# Patient Record
Sex: FEMALE | Race: BLACK OR AFRICAN AMERICAN | NOT HISPANIC OR LATINO
[De-identification: names, ages, dates, MRNs, and addresses within clinical notes are randomized per-mention and may not be internally consistent; named-entity substitution may affect disease eponyms.]

---

## 2018-08-03 PROBLEM — Z00.00 ENCOUNTER FOR PREVENTIVE HEALTH EXAMINATION: Status: ACTIVE | Noted: 2018-08-03

## 2018-09-24 ENCOUNTER — APPOINTMENT (OUTPATIENT)
Dept: GASTROENTEROLOGY | Facility: CLINIC | Age: 63
End: 2018-09-24
Payer: MEDICARE

## 2018-09-24 VITALS
OXYGEN SATURATION: 98 % | BODY MASS INDEX: 32.2 KG/M2 | SYSTOLIC BLOOD PRESSURE: 140 MMHG | RESPIRATION RATE: 14 BRPM | WEIGHT: 175 LBS | HEART RATE: 86 BPM | HEIGHT: 62 IN | TEMPERATURE: 99 F | DIASTOLIC BLOOD PRESSURE: 80 MMHG

## 2018-09-24 DIAGNOSIS — E10.641 TYPE 1 DIABETES MELLITUS WITH HYPOGLYCEMIA WITH COMA: ICD-10-CM

## 2018-09-24 DIAGNOSIS — Z86.39 PERSONAL HISTORY OF OTHER ENDOCRINE, NUTRITIONAL AND METABOLIC DISEASE: ICD-10-CM

## 2018-09-24 DIAGNOSIS — Z80.9 FAMILY HISTORY OF MALIGNANT NEOPLASM, UNSPECIFIED: ICD-10-CM

## 2018-09-24 PROCEDURE — 99203 OFFICE O/P NEW LOW 30 MIN: CPT

## 2018-09-27 ENCOUNTER — APPOINTMENT (OUTPATIENT)
Dept: GASTROENTEROLOGY | Facility: HOSPITAL | Age: 63
End: 2018-09-27

## 2018-09-27 ENCOUNTER — OUTPATIENT (OUTPATIENT)
Dept: OUTPATIENT SERVICES | Facility: HOSPITAL | Age: 63
LOS: 1 days | Discharge: HOME | End: 2018-09-27
Payer: COMMERCIAL

## 2018-09-27 ENCOUNTER — RESULT REVIEW (OUTPATIENT)
Age: 63
End: 2018-09-27

## 2018-09-27 LAB — GLUCOSE BLDC GLUCOMTR-MCNC: 197 MG/DL — HIGH (ref 70–99)

## 2018-09-27 PROCEDURE — 45335 SIGMOIDOSCOPY W/SUBMUC INJ: CPT

## 2018-09-27 PROCEDURE — 82962 GLUCOSE BLOOD TEST: CPT

## 2018-09-27 PROCEDURE — 45331 SIGMOIDOSCOPY AND BIOPSY: CPT

## 2018-09-27 PROCEDURE — C1889: CPT

## 2018-09-27 PROCEDURE — 93005 ELECTROCARDIOGRAM TRACING: CPT

## 2018-09-27 PROCEDURE — 93010 ELECTROCARDIOGRAM REPORT: CPT

## 2018-09-27 PROCEDURE — 45346 SIGMOIDOSCOPY W/ABLATION: CPT | Mod: GC

## 2018-09-27 PROCEDURE — 88305 TISSUE EXAM BY PATHOLOGIST: CPT

## 2018-09-28 LAB — SURGICAL PATHOLOGY STUDY: SIGNIFICANT CHANGE UP

## 2018-09-30 ENCOUNTER — EMERGENCY (EMERGENCY)
Facility: HOSPITAL | Age: 63
LOS: 1 days | Discharge: HOME | End: 2018-09-30
Attending: EMERGENCY MEDICINE | Admitting: EMERGENCY MEDICINE
Payer: COMMERCIAL

## 2018-09-30 VITALS
DIASTOLIC BLOOD PRESSURE: 91 MMHG | HEART RATE: 94 BPM | RESPIRATION RATE: 16 BRPM | SYSTOLIC BLOOD PRESSURE: 136 MMHG | OXYGEN SATURATION: 99 % | TEMPERATURE: 98 F

## 2018-09-30 DIAGNOSIS — K62.89 OTHER SPECIFIED DISEASES OF ANUS AND RECTUM: ICD-10-CM

## 2018-09-30 LAB
ALBUMIN SERPL ELPH-MCNC: 4.2 G/DL — SIGNIFICANT CHANGE UP (ref 3.3–5)
ALP SERPL-CCNC: 118 U/L — SIGNIFICANT CHANGE UP (ref 40–120)
ALT FLD-CCNC: 38 U/L — SIGNIFICANT CHANGE UP (ref 10–45)
ANION GAP SERPL CALC-SCNC: 16 MMOL/L — SIGNIFICANT CHANGE UP (ref 5–17)
AST SERPL-CCNC: 45 U/L — HIGH (ref 10–40)
BASOPHILS NFR BLD AUTO: 0.4 % — SIGNIFICANT CHANGE UP (ref 0–2)
BILIRUB SERPL-MCNC: 0.5 MG/DL — SIGNIFICANT CHANGE UP (ref 0.2–1.2)
BUN SERPL-MCNC: 11 MG/DL — SIGNIFICANT CHANGE UP (ref 7–23)
CALCIUM SERPL-MCNC: 9.8 MG/DL — SIGNIFICANT CHANGE UP (ref 8.4–10.5)
CHLORIDE SERPL-SCNC: 98 MMOL/L — SIGNIFICANT CHANGE UP (ref 96–108)
CO2 SERPL-SCNC: 25 MMOL/L — SIGNIFICANT CHANGE UP (ref 22–31)
CREAT SERPL-MCNC: 1.16 MG/DL — SIGNIFICANT CHANGE UP (ref 0.5–1.3)
EOSINOPHIL NFR BLD AUTO: 1.9 % — SIGNIFICANT CHANGE UP (ref 0–6)
GLUCOSE SERPL-MCNC: 191 MG/DL — HIGH (ref 70–99)
HCT VFR BLD CALC: 38.8 % — SIGNIFICANT CHANGE UP (ref 34.5–45)
HGB BLD-MCNC: 12.9 G/DL — SIGNIFICANT CHANGE UP (ref 11.5–15.5)
LYMPHOCYTES # BLD AUTO: 11.5 % — LOW (ref 13–44)
MCHC RBC-ENTMCNC: 28.6 PG — SIGNIFICANT CHANGE UP (ref 27–34)
MCHC RBC-ENTMCNC: 33.2 G/DL — SIGNIFICANT CHANGE UP (ref 32–36)
MCV RBC AUTO: 86 FL — SIGNIFICANT CHANGE UP (ref 80–100)
MONOCYTES NFR BLD AUTO: 8.6 % — SIGNIFICANT CHANGE UP (ref 2–14)
NEUTROPHILS NFR BLD AUTO: 77.6 % — HIGH (ref 43–77)
PLATELET # BLD AUTO: 270 K/UL — SIGNIFICANT CHANGE UP (ref 150–400)
POTASSIUM SERPL-MCNC: 3.1 MMOL/L — LOW (ref 3.5–5.3)
POTASSIUM SERPL-SCNC: 3.1 MMOL/L — LOW (ref 3.5–5.3)
PROT SERPL-MCNC: 8 G/DL — SIGNIFICANT CHANGE UP (ref 6–8.3)
RBC # BLD: 4.51 M/UL — SIGNIFICANT CHANGE UP (ref 3.8–5.2)
RBC # FLD: 12.8 % — SIGNIFICANT CHANGE UP (ref 10.3–16.9)
SODIUM SERPL-SCNC: 139 MMOL/L — SIGNIFICANT CHANGE UP (ref 135–145)
WBC # BLD: 5.7 K/UL — SIGNIFICANT CHANGE UP (ref 3.8–10.5)
WBC # FLD AUTO: 5.7 K/UL — SIGNIFICANT CHANGE UP (ref 3.8–10.5)

## 2018-09-30 PROCEDURE — 93010 ELECTROCARDIOGRAM REPORT: CPT

## 2018-09-30 PROCEDURE — 99284 EMERGENCY DEPT VISIT MOD MDM: CPT | Mod: 25

## 2018-09-30 PROCEDURE — 74022 RADEX COMPL AQT ABD SERIES: CPT | Mod: 26

## 2018-09-30 RX ORDER — OXYCODONE AND ACETAMINOPHEN 5; 325 MG/1; MG/1
1 TABLET ORAL ONCE
Qty: 0 | Refills: 0 | Status: DISCONTINUED | OUTPATIENT
Start: 2018-09-30 | End: 2018-09-30

## 2018-09-30 RX ORDER — ONDANSETRON 8 MG/1
4 TABLET, FILM COATED ORAL ONCE
Qty: 0 | Refills: 0 | Status: COMPLETED | OUTPATIENT
Start: 2018-09-30 | End: 2018-09-30

## 2018-09-30 RX ORDER — IOHEXOL 300 MG/ML
30 INJECTION, SOLUTION INTRAVENOUS ONCE
Qty: 0 | Refills: 0 | Status: COMPLETED | OUTPATIENT
Start: 2018-09-30 | End: 2018-09-30

## 2018-09-30 RX ORDER — SODIUM CHLORIDE 9 MG/ML
1000 INJECTION INTRAMUSCULAR; INTRAVENOUS; SUBCUTANEOUS ONCE
Qty: 0 | Refills: 0 | Status: COMPLETED | OUTPATIENT
Start: 2018-09-30 | End: 2018-09-30

## 2018-09-30 RX ADMIN — SODIUM CHLORIDE 1000 MILLILITER(S): 9 INJECTION INTRAMUSCULAR; INTRAVENOUS; SUBCUTANEOUS at 22:27

## 2018-09-30 RX ADMIN — IOHEXOL 30 MILLILITER(S): 300 INJECTION, SOLUTION INTRAVENOUS at 22:27

## 2018-09-30 RX ADMIN — OXYCODONE AND ACETAMINOPHEN 1 TABLET(S): 5; 325 TABLET ORAL at 22:15

## 2018-09-30 RX ADMIN — SODIUM CHLORIDE 1000 MILLILITER(S): 9 INJECTION INTRAMUSCULAR; INTRAVENOUS; SUBCUTANEOUS at 23:27

## 2018-09-30 RX ADMIN — OXYCODONE AND ACETAMINOPHEN 1 TABLET(S): 5; 325 TABLET ORAL at 20:53

## 2018-09-30 RX ADMIN — ONDANSETRON 4 MILLIGRAM(S): 8 TABLET, FILM COATED ORAL at 22:28

## 2018-09-30 NOTE — ED ADULT TRIAGE NOTE - ARRIVAL INFO ADDITIONAL COMMENTS
Pt c/o rectal pain and a small amount of bleeding post rectal polyp removed 2 days ago. Pt also states she feels like her heart rate is irregular.

## 2018-09-30 NOTE — ED ADULT NURSE NOTE - OBJECTIVE STATEMENT
pt. reports that she had a polyp removed 2 days ago and has been experiencing some rectal pain and slight bleeding throughout the day today, denies carmelo blood, weakness, syncope, or other complaint at present

## 2018-09-30 NOTE — ED PROVIDER NOTE - MEDICAL DECISION MAKING DETAILS
62 y/o f presents 4 days after colonoscopy during which polyp (?mass) was removed c/o rectal pain since; vss, nontender abd, rectal exam with no blood, labs wnl, given percocet with improvement.  AXR has air fluid levels concerned for ?obstructive process.  Will get CT for further evaluation.

## 2018-09-30 NOTE — ED ADULT NURSE NOTE - CHPI ED NUR SYMPTOMS NEG
no vomiting/no tingling/no dizziness/no weakness/no fever/no nausea/no chills/no decreased eating/drinking

## 2018-09-30 NOTE — ED ADULT NURSE NOTE - NSIMPLEMENTINTERV_GEN_ALL_ED
Implemented All Universal Safety Interventions:  [place] to call system. Call bell, personal items and telephone within reach. Instruct patient to call for assistance. Room bathroom lighting operational. Non-slip footwear when patient is off stretcher. Physically safe environment: no spills, clutter or unnecessary equipment. Stretcher in lowest position, wheels locked, appropriate side rails in place.

## 2018-09-30 NOTE — ED PROVIDER NOTE - OBJECTIVE STATEMENT
62 y/o f presents stating had colonoscopy 4 days ago, polyp was removed from her colon and since then has been having rectal pain.  Pt stating she had small blood initially when wiping which has since resolved.  Pt reporting loose stools since colonoscopy.  Denies fever, chills, n/v, dysuria, all other ROS negative.

## 2018-10-01 VITALS
SYSTOLIC BLOOD PRESSURE: 134 MMHG | OXYGEN SATURATION: 99 % | RESPIRATION RATE: 17 BRPM | DIASTOLIC BLOOD PRESSURE: 68 MMHG | TEMPERATURE: 98 F | HEART RATE: 77 BPM

## 2018-10-01 PROCEDURE — 85025 COMPLETE CBC W/AUTO DIFF WBC: CPT

## 2018-10-01 PROCEDURE — 93005 ELECTROCARDIOGRAM TRACING: CPT

## 2018-10-01 PROCEDURE — 74177 CT ABD & PELVIS W/CONTRAST: CPT

## 2018-10-01 PROCEDURE — 96374 THER/PROPH/DIAG INJ IV PUSH: CPT | Mod: XU

## 2018-10-01 PROCEDURE — 74177 CT ABD & PELVIS W/CONTRAST: CPT | Mod: 26

## 2018-10-01 PROCEDURE — 99284 EMERGENCY DEPT VISIT MOD MDM: CPT | Mod: 25

## 2018-10-01 PROCEDURE — 80053 COMPREHEN METABOLIC PANEL: CPT

## 2018-10-01 PROCEDURE — 74022 RADEX COMPL AQT ABD SERIES: CPT

## 2018-10-01 PROCEDURE — 96361 HYDRATE IV INFUSION ADD-ON: CPT

## 2018-10-01 RX ORDER — OXYCODONE HYDROCHLORIDE 5 MG/1
1 TABLET ORAL
Qty: 10 | Refills: 0 | OUTPATIENT
Start: 2018-10-01

## 2018-10-01 NOTE — ED ADULT NURSE REASSESSMENT NOTE - NS ED NURSE REASSESS COMMENT FT1
PA at bedside discussing findings and dispo. with pt.
PA at bedside for EKTA
assessment as noted, nad, placed in gown for exam, provided with warm blanket for comfort
back to bed without incident, spoke with ALEJANDRO, CT delayed, CT tech "backed up,", pt. aware, with understanding verbalized
dispo. papers in progress, iv was d/c'd as noted, pt. utd, with understanding verbalized
interventions as noted, endy. well, p.o. contrast initiated, utd on poc, with understanding verbalized, assessment on-going
preliminary CT results back, awaiting final dispo.
pt. ambulated to restroom once again, with steady gait noted
pt. ambulated to restroom with steady gait noted
pt. ambulated to restroom, with steady gait noted, still awaiting CT
pt. back from CT, nad or change in condition, awaiting results, utd, with understanding verbalized
pt. back to bed without incident, awaiting CT at approx. 2330, assessment on-going
pt. medicated as noted, endy. well, utd on poc, with understanding verbalized
pt. remains in CT at present

## 2018-10-15 PROBLEM — K63.5 POLYP OF COLON: Chronic | Status: ACTIVE | Noted: 2018-09-30

## 2018-11-05 ENCOUNTER — APPOINTMENT (OUTPATIENT)
Dept: GASTROENTEROLOGY | Facility: CLINIC | Age: 63
End: 2018-11-05

## 2018-12-05 ENCOUNTER — RESULT REVIEW (OUTPATIENT)
Age: 63
End: 2018-12-05

## 2018-12-05 ENCOUNTER — OUTPATIENT (OUTPATIENT)
Dept: OUTPATIENT SERVICES | Facility: HOSPITAL | Age: 63
LOS: 1 days | Discharge: HOME | End: 2018-12-05
Payer: COMMERCIAL

## 2018-12-05 ENCOUNTER — APPOINTMENT (OUTPATIENT)
Dept: GASTROENTEROLOGY | Facility: HOSPITAL | Age: 63
End: 2018-12-05

## 2018-12-05 LAB — GLUCOSE BLDC GLUCOMTR-MCNC: 182 MG/DL — HIGH (ref 70–99)

## 2018-12-05 PROCEDURE — 45338 SIGMOIDOSCOPY W/TUMR REMOVE: CPT

## 2018-12-05 PROCEDURE — 88305 TISSUE EXAM BY PATHOLOGIST: CPT

## 2018-12-05 PROCEDURE — 45333 SIGMOIDOSCOPY & POLYPECTOMY: CPT

## 2018-12-05 PROCEDURE — 82962 GLUCOSE BLOOD TEST: CPT

## 2018-12-06 LAB — SURGICAL PATHOLOGY STUDY: SIGNIFICANT CHANGE UP

## 2019-01-24 ENCOUNTER — APPOINTMENT (OUTPATIENT)
Dept: GASTROENTEROLOGY | Facility: HOSPITAL | Age: 64
End: 2019-01-24

## 2019-01-24 ENCOUNTER — OUTPATIENT (OUTPATIENT)
Dept: OUTPATIENT SERVICES | Facility: HOSPITAL | Age: 64
LOS: 1 days | Discharge: HOME | End: 2019-01-24
Payer: COMMERCIAL

## 2019-01-24 LAB — GLUCOSE BLDC GLUCOMTR-MCNC: 148 MG/DL — HIGH (ref 70–99)

## 2019-01-24 PROCEDURE — 82962 GLUCOSE BLOOD TEST: CPT

## 2019-01-24 PROCEDURE — 45331 SIGMOIDOSCOPY AND BIOPSY: CPT

## 2019-01-24 PROCEDURE — 45378 DIAGNOSTIC COLONOSCOPY: CPT

## 2019-07-11 ENCOUNTER — APPOINTMENT (OUTPATIENT)
Dept: GASTROENTEROLOGY | Facility: HOSPITAL | Age: 64
End: 2019-07-11

## 2019-07-11 ENCOUNTER — RESULT REVIEW (OUTPATIENT)
Age: 64
End: 2019-07-11

## 2019-07-11 ENCOUNTER — OUTPATIENT (OUTPATIENT)
Dept: OUTPATIENT SERVICES | Facility: HOSPITAL | Age: 64
LOS: 1 days | Discharge: HOME | End: 2019-07-11
Payer: MEDICARE

## 2019-07-11 LAB — GLUCOSE BLDC GLUCOMTR-MCNC: 147 MG/DL — HIGH (ref 70–99)

## 2019-07-11 PROCEDURE — 45331 SIGMOIDOSCOPY AND BIOPSY: CPT

## 2019-07-12 LAB — SURGICAL PATHOLOGY STUDY: SIGNIFICANT CHANGE UP

## 2019-07-12 PROCEDURE — 45331 SIGMOIDOSCOPY AND BIOPSY: CPT

## 2019-07-12 PROCEDURE — 82962 GLUCOSE BLOOD TEST: CPT

## 2019-07-12 PROCEDURE — 88305 TISSUE EXAM BY PATHOLOGIST: CPT

## 2019-07-18 ENCOUNTER — RESULT REVIEW (OUTPATIENT)
Age: 64
End: 2019-07-18

## 2019-10-08 ENCOUNTER — FORM ENCOUNTER (OUTPATIENT)
Age: 64
End: 2019-10-08

## 2019-10-09 ENCOUNTER — APPOINTMENT (OUTPATIENT)
Dept: GASTROENTEROLOGY | Facility: HOSPITAL | Age: 64
End: 2019-10-09

## 2019-10-09 ENCOUNTER — OUTPATIENT (OUTPATIENT)
Dept: OUTPATIENT SERVICES | Facility: HOSPITAL | Age: 64
LOS: 1 days | Discharge: HOME | End: 2019-10-09
Payer: MEDICARE

## 2019-10-09 LAB — GLUCOSE BLDC GLUCOMTR-MCNC: 156 MG/DL — HIGH (ref 70–99)

## 2019-10-09 PROCEDURE — 45330 DIAGNOSTIC SIGMOIDOSCOPY: CPT

## 2019-10-09 PROCEDURE — 82962 GLUCOSE BLOOD TEST: CPT

## 2020-02-13 ENCOUNTER — OUTPATIENT (OUTPATIENT)
Dept: OUTPATIENT SERVICES | Facility: HOSPITAL | Age: 65
LOS: 1 days | Discharge: HOME | End: 2020-02-13
Payer: MEDICARE

## 2020-02-13 ENCOUNTER — APPOINTMENT (OUTPATIENT)
Dept: GASTROENTEROLOGY | Facility: HOSPITAL | Age: 65
End: 2020-02-13

## 2020-02-13 ENCOUNTER — RESULT REVIEW (OUTPATIENT)
Age: 65
End: 2020-02-13

## 2020-02-13 LAB — GLUCOSE BLDC GLUCOMTR-MCNC: 140 MG/DL — HIGH (ref 70–99)

## 2020-02-13 PROCEDURE — 88305 TISSUE EXAM BY PATHOLOGIST: CPT

## 2020-02-13 PROCEDURE — 82962 GLUCOSE BLOOD TEST: CPT

## 2020-02-13 PROCEDURE — 88305 TISSUE EXAM BY PATHOLOGIST: CPT | Mod: 26

## 2020-02-13 PROCEDURE — 45331 SIGMOIDOSCOPY AND BIOPSY: CPT

## 2020-02-13 PROCEDURE — 45330 DIAGNOSTIC SIGMOIDOSCOPY: CPT

## 2020-02-18 LAB — SURGICAL PATHOLOGY STUDY: SIGNIFICANT CHANGE UP

## 2020-06-11 ENCOUNTER — APPOINTMENT (OUTPATIENT)
Dept: GASTROENTEROLOGY | Facility: HOSPITAL | Age: 65
End: 2020-06-11

## 2020-06-25 ENCOUNTER — APPOINTMENT (OUTPATIENT)
Dept: GASTROENTEROLOGY | Facility: CLINIC | Age: 65
End: 2020-06-25

## 2020-11-11 ENCOUNTER — APPOINTMENT (OUTPATIENT)
Age: 65
End: 2020-11-11

## 2020-11-11 ENCOUNTER — OUTPATIENT (OUTPATIENT)
Dept: OUTPATIENT SERVICES | Facility: HOSPITAL | Age: 65
LOS: 1 days | Discharge: HOME | End: 2020-11-11
Payer: MEDICARE

## 2020-11-11 LAB — GLUCOSE BLDC GLUCOMTR-MCNC: 155 MG/DL — HIGH (ref 70–99)

## 2020-11-11 PROCEDURE — 82962 GLUCOSE BLOOD TEST: CPT

## 2020-11-11 PROCEDURE — 45330 DIAGNOSTIC SIGMOIDOSCOPY: CPT

## 2021-07-23 VITALS
DIASTOLIC BLOOD PRESSURE: 94 MMHG | HEART RATE: 90 BPM | RESPIRATION RATE: 16 BRPM | SYSTOLIC BLOOD PRESSURE: 138 MMHG | OXYGEN SATURATION: 99 % | TEMPERATURE: 97 F

## 2021-07-23 LAB
ALBUMIN SERPL ELPH-MCNC: 4.3 G/DL — SIGNIFICANT CHANGE UP (ref 3.3–5)
ALP SERPL-CCNC: 69 U/L — SIGNIFICANT CHANGE UP (ref 40–120)
ALT FLD-CCNC: SIGNIFICANT CHANGE UP (ref 10–45)
ANION GAP SERPL CALC-SCNC: 12 MMOL/L — SIGNIFICANT CHANGE UP (ref 5–17)
APTT BLD: 28.8 SEC — SIGNIFICANT CHANGE UP (ref 27.5–35.5)
AST SERPL-CCNC: SIGNIFICANT CHANGE UP (ref 10–40)
BASE EXCESS BLDV CALC-SCNC: -0.7 MMOL/L — SIGNIFICANT CHANGE UP (ref -2–3)
BASOPHILS # BLD AUTO: 0.01 K/UL — SIGNIFICANT CHANGE UP (ref 0–0.2)
BASOPHILS NFR BLD AUTO: 0.2 % — SIGNIFICANT CHANGE UP (ref 0–2)
BILIRUB SERPL-MCNC: 0.9 MG/DL — SIGNIFICANT CHANGE UP (ref 0.2–1.2)
BUN SERPL-MCNC: 26 MG/DL — HIGH (ref 7–23)
CALCIUM SERPL-MCNC: 9.8 MG/DL — SIGNIFICANT CHANGE UP (ref 8.4–10.5)
CHLORIDE SERPL-SCNC: 99 MMOL/L — SIGNIFICANT CHANGE UP (ref 96–108)
CHOLEST SERPL-MCNC: 231 MG/DL — HIGH
CO2 BLDV-SCNC: 25.4 MMOL/L — SIGNIFICANT CHANGE UP (ref 22–26)
CO2 SERPL-SCNC: 21 MMOL/L — LOW (ref 22–31)
CREAT SERPL-MCNC: 2.05 MG/DL — HIGH (ref 0.5–1.3)
EOSINOPHIL # BLD AUTO: 0.01 K/UL — SIGNIFICANT CHANGE UP (ref 0–0.5)
EOSINOPHIL NFR BLD AUTO: 0.2 % — SIGNIFICANT CHANGE UP (ref 0–6)
GLUCOSE SERPL-MCNC: 125 MG/DL — HIGH (ref 70–99)
HCO3 BLDV-SCNC: 24 MMOL/L — SIGNIFICANT CHANGE UP (ref 22–29)
HCT VFR BLD CALC: 45.3 % — HIGH (ref 34.5–45)
HDLC SERPL-MCNC: 45 MG/DL — LOW
HGB BLD-MCNC: 14.6 G/DL — SIGNIFICANT CHANGE UP (ref 11.5–15.5)
IMM GRANULOCYTES NFR BLD AUTO: 0.4 % — SIGNIFICANT CHANGE UP (ref 0–1.5)
INR BLD: 1.12 — SIGNIFICANT CHANGE UP (ref 0.88–1.16)
LIPID PNL WITH DIRECT LDL SERPL: 155 MG/DL — HIGH
LYMPHOCYTES # BLD AUTO: 0.77 K/UL — LOW (ref 1–3.3)
LYMPHOCYTES # BLD AUTO: 14.7 % — SIGNIFICANT CHANGE UP (ref 13–44)
MCHC RBC-ENTMCNC: 27.4 PG — SIGNIFICANT CHANGE UP (ref 27–34)
MCHC RBC-ENTMCNC: 32.2 GM/DL — SIGNIFICANT CHANGE UP (ref 32–36)
MCV RBC AUTO: 85 FL — SIGNIFICANT CHANGE UP (ref 80–100)
MONOCYTES # BLD AUTO: 0.56 K/UL — SIGNIFICANT CHANGE UP (ref 0–0.9)
MONOCYTES NFR BLD AUTO: 10.7 % — SIGNIFICANT CHANGE UP (ref 2–14)
NEUTROPHILS # BLD AUTO: 3.88 K/UL — SIGNIFICANT CHANGE UP (ref 1.8–7.4)
NEUTROPHILS NFR BLD AUTO: 73.8 % — SIGNIFICANT CHANGE UP (ref 43–77)
NON HDL CHOLESTEROL: 186 MG/DL — HIGH
NRBC # BLD: 0 /100 WBCS — SIGNIFICANT CHANGE UP (ref 0–0)
PCO2 BLDV: 40 MMHG — SIGNIFICANT CHANGE UP (ref 39–42)
PH BLDV: 7.39 — SIGNIFICANT CHANGE UP (ref 7.32–7.43)
PLATELET # BLD AUTO: 284 K/UL — SIGNIFICANT CHANGE UP (ref 150–400)
PO2 BLDV: 21 MMHG — SIGNIFICANT CHANGE UP
POTASSIUM SERPL-MCNC: SIGNIFICANT CHANGE UP (ref 3.5–5.3)
POTASSIUM SERPL-SCNC: SIGNIFICANT CHANGE UP (ref 3.5–5.3)
PROT SERPL-MCNC: 8 G/DL — SIGNIFICANT CHANGE UP (ref 6–8.3)
PROTHROM AB SERPL-ACNC: 13.4 SEC — SIGNIFICANT CHANGE UP (ref 10.6–13.6)
RBC # BLD: 5.33 M/UL — HIGH (ref 3.8–5.2)
RBC # FLD: 14.5 % — SIGNIFICANT CHANGE UP (ref 10.3–14.5)
SAO2 % BLDV: 31.9 % — SIGNIFICANT CHANGE UP
SODIUM SERPL-SCNC: 132 MMOL/L — LOW (ref 135–145)
TRIGL SERPL-MCNC: 156 MG/DL — HIGH
TROPONIN T SERPL-MCNC: 0.01 NG/ML — SIGNIFICANT CHANGE UP (ref 0–0.01)
WBC # BLD: 5.25 K/UL — SIGNIFICANT CHANGE UP (ref 3.8–10.5)
WBC # FLD AUTO: 5.25 K/UL — SIGNIFICANT CHANGE UP (ref 3.8–10.5)

## 2021-07-23 PROCEDURE — 70498 CT ANGIOGRAPHY NECK: CPT | Mod: 26,MA

## 2021-07-23 PROCEDURE — 70496 CT ANGIOGRAPHY HEAD: CPT | Mod: 26,MA

## 2021-07-23 PROCEDURE — 0042T: CPT

## 2021-07-23 PROCEDURE — 99291 CRITICAL CARE FIRST HOUR: CPT

## 2021-07-23 PROCEDURE — 70450 CT HEAD/BRAIN W/O DYE: CPT | Mod: 26,MA,59

## 2021-07-23 PROCEDURE — 71045 X-RAY EXAM CHEST 1 VIEW: CPT | Mod: 26

## 2021-07-23 RX ORDER — HEPARIN SODIUM 5000 [USP'U]/ML
5000 INJECTION INTRAVENOUS; SUBCUTANEOUS EVERY 8 HOURS
Refills: 0 | Status: DISCONTINUED | OUTPATIENT
Start: 2021-07-23 | End: 2021-07-26

## 2021-07-23 RX ORDER — CLOPIDOGREL BISULFATE 75 MG/1
75 TABLET, FILM COATED ORAL ONCE
Refills: 0 | Status: COMPLETED | OUTPATIENT
Start: 2021-07-23 | End: 2021-07-23

## 2021-07-23 RX ORDER — ATORVASTATIN CALCIUM 80 MG/1
80 TABLET, FILM COATED ORAL AT BEDTIME
Refills: 0 | Status: DISCONTINUED | OUTPATIENT
Start: 2021-07-23 | End: 2021-08-02

## 2021-07-23 RX ORDER — ASPIRIN/CALCIUM CARB/MAGNESIUM 324 MG
81 TABLET ORAL DAILY
Refills: 0 | Status: DISCONTINUED | OUTPATIENT
Start: 2021-07-24 | End: 2021-07-26

## 2021-07-23 NOTE — CONSULT NOTE ADULT - ASSESSMENT
58y Female with PMHx of HTN, brought to the hospital for AMS and confusion. Patient was last seen normal by her cousin 72h ago, she noticed facial asymmetry, but thought it is related to recent dental manipulation. She also noted that she is confused. She tried calling her the next day, and patient didn't answer so she got concerned as patient always pickup her phone. Patient was found altered, and confused. In ED stroke code called initially then was cancelled as symptoms have been there >48h. NIHSS 9. CTH left frontal subcortical infarct. CTA  head showed Mild fusiform dilation of the right V4 artery to 4 mm as it traverses the foramen magnum without evidence of rupture, and 1 mm outpouchings superior to this focal dilation which may represent small saccular aneurysms or the origin of small caliber vessels. CTA neck negative. CTP showing no perfusion defect. Patient to be admitted to [facility] for further workup.  mg once given.    Neuro  #CVA workup  - s/p 325 ASA in ED  - continue aspirin 81mg and plavix 75mg daily  - continue atorvastatin 80mg daily  - q4hr stroke neuro checks and vitals  - obtain MRI Brain without contrast  - TTE with buble  - A1c, lipid panel, TSH  - PT/OT/rehab  - DVT prophylaxis    #HTN  - permissive hypertension, Goal -180  - TTE with bubble

## 2021-07-23 NOTE — ED ADULT NURSE NOTE - CHIEF COMPLAINT QUOTE
Patient presents for reported disorientation. Wrote "evlauation" on ED triage form. Patient vomited once in triage. Able to state her name and note her birthday.

## 2021-07-23 NOTE — STROKE CODE NOTE - THROMBOLYTIC THERAPY GIVEN AT AN OUTSIDE HOSPITAL WITHIN 24 HOURS OF ARRIVAL
Patient notified that prescriptions sent and that while on the Doxycyline she should hold her Multivitamin due to the iron in it interfering with the effectiveness of the doxycyline.  Patient agrees to hold the Multivitamin.   No

## 2021-07-23 NOTE — ED ADULT NURSE NOTE - OBJECTIVE STATEMENT
Pt presents asking for an evaluation. Vomited once in triage. Cousin with her states that she has been disoriented for 3 days. Pt able to state her name and birthday. Unable to state year or situation. R face droop noted. MD Elizondo at assessment. No weakness or decreased sensation on either side. IV placed will continue to monitor.

## 2021-07-24 ENCOUNTER — INPATIENT (INPATIENT)
Facility: HOSPITAL | Age: 66
LOS: 8 days | Discharge: NURSING, REHAB OR LONG-TERM CARE | DRG: 65 | End: 2021-08-02
Attending: PSYCHIATRY & NEUROLOGY | Admitting: PSYCHIATRY & NEUROLOGY
Payer: MEDICARE

## 2021-07-24 DIAGNOSIS — R29.810 FACIAL WEAKNESS: ICD-10-CM

## 2021-07-24 DIAGNOSIS — I63.9 CEREBRAL INFARCTION, UNSPECIFIED: ICD-10-CM

## 2021-07-24 DIAGNOSIS — M62.82 RHABDOMYOLYSIS: ICD-10-CM

## 2021-07-24 DIAGNOSIS — Z79.899 OTHER LONG TERM (CURRENT) DRUG THERAPY: ICD-10-CM

## 2021-07-24 DIAGNOSIS — R29.709 NIHSS SCORE 9: ICD-10-CM

## 2021-07-24 DIAGNOSIS — G93.49 OTHER ENCEPHALOPATHY: ICD-10-CM

## 2021-07-24 DIAGNOSIS — M48.02 SPINAL STENOSIS, CERVICAL REGION: ICD-10-CM

## 2021-07-24 DIAGNOSIS — E78.5 HYPERLIPIDEMIA, UNSPECIFIED: ICD-10-CM

## 2021-07-24 DIAGNOSIS — N18.9 CHRONIC KIDNEY DISEASE, UNSPECIFIED: ICD-10-CM

## 2021-07-24 DIAGNOSIS — Z79.01 LONG TERM (CURRENT) USE OF ANTICOAGULANTS: ICD-10-CM

## 2021-07-24 DIAGNOSIS — D70.9 NEUTROPENIA, UNSPECIFIED: ICD-10-CM

## 2021-07-24 DIAGNOSIS — Z91.14 PATIENT'S OTHER NONCOMPLIANCE WITH MEDICATION REGIMEN: ICD-10-CM

## 2021-07-24 DIAGNOSIS — R47.1 DYSARTHRIA AND ANARTHRIA: ICD-10-CM

## 2021-07-24 DIAGNOSIS — I12.9 HYPERTENSIVE CHRONIC KIDNEY DISEASE WITH STAGE 1 THROUGH STAGE 4 CHRONIC KIDNEY DISEASE, OR UNSPECIFIED CHRONIC KIDNEY DISEASE: ICD-10-CM

## 2021-07-24 DIAGNOSIS — E11.22 TYPE 2 DIABETES MELLITUS WITH DIABETIC CHRONIC KIDNEY DISEASE: ICD-10-CM

## 2021-07-24 DIAGNOSIS — R47.81 SLURRED SPEECH: ICD-10-CM

## 2021-07-24 DIAGNOSIS — I48.91 UNSPECIFIED ATRIAL FIBRILLATION: ICD-10-CM

## 2021-07-24 DIAGNOSIS — I63.40 CEREBRAL INFARCTION DUE TO EMBOLISM OF UNSPECIFIED CEREBRAL ARTERY: ICD-10-CM

## 2021-07-24 DIAGNOSIS — R47.01 APHASIA: ICD-10-CM

## 2021-07-24 DIAGNOSIS — N17.9 ACUTE KIDNEY FAILURE, UNSPECIFIED: ICD-10-CM

## 2021-07-24 LAB
A1C WITH ESTIMATED AVERAGE GLUCOSE RESULT: 6.3 % — HIGH (ref 4–5.6)
ALBUMIN SERPL ELPH-MCNC: 4 G/DL — SIGNIFICANT CHANGE UP (ref 3.3–5)
ALP SERPL-CCNC: 68 U/L — SIGNIFICANT CHANGE UP (ref 40–120)
ALT FLD-CCNC: 18 U/L — SIGNIFICANT CHANGE UP (ref 10–45)
ANION GAP SERPL CALC-SCNC: 15 MMOL/L — SIGNIFICANT CHANGE UP (ref 5–17)
AST SERPL-CCNC: 33 U/L — SIGNIFICANT CHANGE UP (ref 10–40)
BASOPHILS # BLD AUTO: 0.01 K/UL — SIGNIFICANT CHANGE UP (ref 0–0.2)
BASOPHILS NFR BLD AUTO: 0.3 % — SIGNIFICANT CHANGE UP (ref 0–2)
BILIRUB SERPL-MCNC: 1.1 MG/DL — SIGNIFICANT CHANGE UP (ref 0.2–1.2)
BUN SERPL-MCNC: 21 MG/DL — SIGNIFICANT CHANGE UP (ref 7–23)
CALCIUM SERPL-MCNC: 9.9 MG/DL — SIGNIFICANT CHANGE UP (ref 8.4–10.5)
CHLORIDE SERPL-SCNC: 103 MMOL/L — SIGNIFICANT CHANGE UP (ref 96–108)
CK SERPL-CCNC: 278 U/L — HIGH (ref 25–170)
CK SERPL-CCNC: 321 U/L — HIGH (ref 25–170)
CO2 SERPL-SCNC: 21 MMOL/L — LOW (ref 22–31)
COVID-19 SPIKE DOMAIN AB INTERP: NEGATIVE — SIGNIFICANT CHANGE UP
COVID-19 SPIKE DOMAIN ANTIBODY RESULT: 0.4 U/ML — SIGNIFICANT CHANGE UP
CREAT SERPL-MCNC: 1.35 MG/DL — HIGH (ref 0.5–1.3)
EOSINOPHIL # BLD AUTO: 0.02 K/UL — SIGNIFICANT CHANGE UP (ref 0–0.5)
EOSINOPHIL NFR BLD AUTO: 0.5 % — SIGNIFICANT CHANGE UP (ref 0–6)
ESTIMATED AVERAGE GLUCOSE: 134 MG/DL — HIGH (ref 68–114)
GLUCOSE SERPL-MCNC: 122 MG/DL — HIGH (ref 70–99)
HCT VFR BLD CALC: 44.6 % — SIGNIFICANT CHANGE UP (ref 34.5–45)
HCV AB S/CO SERPL IA: 0.04 S/CO — SIGNIFICANT CHANGE UP
HCV AB SERPL-IMP: SIGNIFICANT CHANGE UP
HGB BLD-MCNC: 14.1 G/DL — SIGNIFICANT CHANGE UP (ref 11.5–15.5)
IMM GRANULOCYTES NFR BLD AUTO: 0.3 % — SIGNIFICANT CHANGE UP (ref 0–1.5)
LYMPHOCYTES # BLD AUTO: 0.61 K/UL — LOW (ref 1–3.3)
LYMPHOCYTES # BLD AUTO: 16.5 % — SIGNIFICANT CHANGE UP (ref 13–44)
MAGNESIUM SERPL-MCNC: 2.2 MG/DL — SIGNIFICANT CHANGE UP (ref 1.6–2.6)
MCHC RBC-ENTMCNC: 27.1 PG — SIGNIFICANT CHANGE UP (ref 27–34)
MCHC RBC-ENTMCNC: 31.6 GM/DL — LOW (ref 32–36)
MCV RBC AUTO: 85.6 FL — SIGNIFICANT CHANGE UP (ref 80–100)
MONOCYTES # BLD AUTO: 0.43 K/UL — SIGNIFICANT CHANGE UP (ref 0–0.9)
MONOCYTES NFR BLD AUTO: 11.6 % — SIGNIFICANT CHANGE UP (ref 2–14)
NEUTROPHILS # BLD AUTO: 2.62 K/UL — SIGNIFICANT CHANGE UP (ref 1.8–7.4)
NEUTROPHILS NFR BLD AUTO: 70.8 % — SIGNIFICANT CHANGE UP (ref 43–77)
NRBC # BLD: 0 /100 WBCS — SIGNIFICANT CHANGE UP (ref 0–0)
PHOSPHATE SERPL-MCNC: 3.4 MG/DL — SIGNIFICANT CHANGE UP (ref 2.5–4.5)
PLATELET # BLD AUTO: 257 K/UL — SIGNIFICANT CHANGE UP (ref 150–400)
POTASSIUM SERPL-MCNC: 3 MMOL/L — LOW (ref 3.5–5.3)
POTASSIUM SERPL-SCNC: 3 MMOL/L — LOW (ref 3.5–5.3)
PROT SERPL-MCNC: 7.4 G/DL — SIGNIFICANT CHANGE UP (ref 6–8.3)
RBC # BLD: 5.21 M/UL — HIGH (ref 3.8–5.2)
RBC # FLD: 14.6 % — HIGH (ref 10.3–14.5)
SARS-COV-2 IGG+IGM SERPL QL IA: 0.4 U/ML — SIGNIFICANT CHANGE UP
SARS-COV-2 IGG+IGM SERPL QL IA: NEGATIVE — SIGNIFICANT CHANGE UP
SARS-COV-2 RNA SPEC QL NAA+PROBE: SIGNIFICANT CHANGE UP
SODIUM SERPL-SCNC: 139 MMOL/L — SIGNIFICANT CHANGE UP (ref 135–145)
TSH SERPL-MCNC: 0.8 UIU/ML — SIGNIFICANT CHANGE UP (ref 0.27–4.2)
WBC # BLD: 3.7 K/UL — LOW (ref 3.8–10.5)
WBC # FLD AUTO: 3.7 K/UL — LOW (ref 3.8–10.5)

## 2021-07-24 PROCEDURE — 99222 1ST HOSP IP/OBS MODERATE 55: CPT

## 2021-07-24 PROCEDURE — 99231 SBSQ HOSP IP/OBS SF/LOW 25: CPT

## 2021-07-24 RX ORDER — POTASSIUM CHLORIDE 20 MEQ
20 PACKET (EA) ORAL
Refills: 0 | Status: COMPLETED | OUTPATIENT
Start: 2021-07-24 | End: 2021-07-24

## 2021-07-24 RX ADMIN — HEPARIN SODIUM 5000 UNIT(S): 5000 INJECTION INTRAVENOUS; SUBCUTANEOUS at 06:18

## 2021-07-24 RX ADMIN — Medication 81 MILLIGRAM(S): at 11:31

## 2021-07-24 RX ADMIN — ATORVASTATIN CALCIUM 80 MILLIGRAM(S): 80 TABLET, FILM COATED ORAL at 21:32

## 2021-07-24 RX ADMIN — ATORVASTATIN CALCIUM 80 MILLIGRAM(S): 80 TABLET, FILM COATED ORAL at 00:27

## 2021-07-24 RX ADMIN — HEPARIN SODIUM 5000 UNIT(S): 5000 INJECTION INTRAVENOUS; SUBCUTANEOUS at 16:01

## 2021-07-24 RX ADMIN — Medication 20 MILLIEQUIVALENT(S): at 16:01

## 2021-07-24 RX ADMIN — HEPARIN SODIUM 5000 UNIT(S): 5000 INJECTION INTRAVENOUS; SUBCUTANEOUS at 21:32

## 2021-07-24 RX ADMIN — CLOPIDOGREL BISULFATE 75 MILLIGRAM(S): 75 TABLET, FILM COATED ORAL at 00:24

## 2021-07-24 RX ADMIN — Medication 20 MILLIEQUIVALENT(S): at 11:31

## 2021-07-24 RX ADMIN — Medication 20 MILLIEQUIVALENT(S): at 18:58

## 2021-07-24 NOTE — SPEECH LANGUAGE PATHOLOGY EVALUATION - RESPONSIVE NAMING
Phrase/sentence completion (predictable): 87%. Object function:57% ind. which increased to 85% given gestural cues Phrase/sentence completion (predictable): 87%. Object function:57% ind. which increased to 85% given gestural cues/impaired

## 2021-07-24 NOTE — CHART NOTE - NSCHARTNOTEFT_GEN_A_CORE
Spoke to patient's son, Mingo, at bedside. Son tearful that mother is having difficulty speaking. Explained that she had a stroke. All questions and concerns addressed. Stated that pharmacy is [facility], however, called and stated that not a patient at the pharmacy. Let son know that we do not have her pharmacy to confirm her home medications and he does not know them. States Dr. Kan is PCP. Attempted to reach, however, unavailable.

## 2021-07-24 NOTE — PHYSICAL THERAPY INITIAL EVALUATION ADULT - PERTINENT HX OF CURRENT PROBLEM, REHAB EVAL
Pt is a 59 yo female brought to the hospital for AMS and confusion. In ED stroke code called initially then was cancelled as symptoms have been there >48h. NIHSS 9. CTH left frontal subcortical infarct. CTA  head showed Mild fusiform dilation of the right V4 artery to 4 mm as it traverses the foramen magnum without evidence of rupture, and 1 mm outpouchings superior to this focal dilation which may represent small saccular aneurysms or the origin of small caliber vessels.

## 2021-07-24 NOTE — PROGRESS NOTE ADULT - ASSESSMENT
58y Female with PMHx of HTN, brought to the hospital for AMS and confusion. Patient was last seen normal by her cousin 72h ago, she noticed facial asymmetry, but thought it is related to recent dental manipulation. She also noted that she is confused. She tried calling her the next day, and patient didn't answer so she got concerned as patient always pickup her phone. Patient was found altered, and confused. In ED stroke code called initially then was cancelled as symptoms have been there >48h. NIHSS 9. CTH left frontal subcortical infarct. CTA  head showed Mild fusiform dilation of the right V4 artery to 4 mm as it traverses the foramen magnum without evidence of rupture, and 1 mm outpouchings superior to this focal dilation which may represent small saccular aneurysms or the origin of small caliber vessels. CTA neck negative. CTP showing no perfusion defect. Patient to be admitted to [facility] for further workup.  mg once given.    Neuro  #CVA workup  - s/p 325 ASA in ED  - continue aspirin 81mg and plavix 75mg daily  - continue atorvastatin 80mg daily  - q4hr stroke neuro checks and vitals  - obtain MRI Brain without contrast  - TTE with buble  - A1c, lipid panel, TSH  - PT/OT/rehab  - DVT prophylaxis    #HTN  - permissive hypertension, Goal -180  - TTE with bubble        Neuro  #CVA workup  - continue aspirin 81mg and plavix 75mg daily  - continue atorvastatin 80mg daily  - q4hr stroke neuro checks and vitals  - obtain MRI Brain without contrast  - Stroke Code HCT Results:   - Stroke Code CTA Results:  - Stroke education    Cards  #HTN  - permissive hypertension, Goal -180  - hold home blood pressure medication for now  - obtain TTE with bubble  - Stroke Code EKG Results:    #HLD  - high dose statin as above in CVA  - LDL results:     Pulm  - call provider if SPO2 < 94%    GI  #Nutrition/Fluids/Electrolytes   - replete K<4 and Mg <2  - Diet:  - IVF:     Renal  -     Infectious Disease  - Stroke Code CXR results:     Endocrine  #DM  - A1C results:   - ISS    - TSH results:    DVT Prophylaxis  - lovenox sq for DVT prophylaxis   - SCDs for DVT prophylaxis       IDR Goals: Goals reviewed at interdisciplinary rounds with case management, social work, physical therapy, occupational therapy, and speech language pathology.   Please see specific therapy  notes for in depth goals.  Dispo: **********(Acute rehab - can tolerate 3 hours of therapy)     Discussed daily hospital plans and goals with patient and family at bedside. (Called and updated family.)    Discussed with Neurology Attending 58y Female with PMHx of HTN, brought to the hospital for AMS and confusion. Patient was last seen normal by her cousin 72h ago, she noticed facial asymmetry, but thought it is related to recent dental manipulation. She also noted that she is confused. She tried calling her the next day, and patient didn't answer so she got concerned as patient always pickup her phone. Patient was found altered, and confused. In ED stroke code called initially then was cancelled as symptoms have been there >48h. NIHSS 9. CTH left frontal subcortical infarct. CTA  head showed Mild fusiform dilation of the right V4 artery to 4 mm as it traverses the foramen magnum without evidence of rupture, and 1 mm outpouchings superior to this focal dilation which may represent small saccular aneurysms or the origin of small caliber vessels. CTA neck negative. CTP showing no perfusion defect. Patient to be admitted to [facility] for further workup.    Neuro  #CVA workup  - continue aspirin 81mg and plavix 75mg daily  - continue atorvastatin 80mg daily  - q4hr stroke neuro checks and vitals  - obtain MRI Brain without contrast  - TTE with buble  - A1c, lipid panel, TSH  - PT/OT/speech/rehab  - DVT prophylaxis    #HTN  - permissive hypertension, Goal -180  - TTE with bubble    Neuro  #CVA workup  - continue aspirin 81mg and Plavix 75mg daily  - continue atorvastatin 80mg daily  - q4hr stroke neuro checks and vitals  - obtain MRI Brain without contrast  - Stroke Code HCT Results: No acute intracranial hemorrhage or demarcated transcortical infarction. eft frontal subcortical white matter infarct is favored to be chronic.  - Stroke Code CTA Results: Mild fusiform dilation of the right V4 artery to 4 mm as it traverses the foramen magnum without evidence of rupture. There are couple 1 mm outpouchings superior to this focal dilation which may represent small saccular aneurysms or the origin of small caliber vessels. Unremarkable CTA examination of the neck.  - Stroke education    Cards  #HTN  - permissive hypertension, Goal -180  - hold home blood pressure medication for now  - obtain TTE with bubble   - Stroke Code EKG Results: NSR    #HLD  - high dose statin as above in CVA  - LDL results: 155    Pulm  - call provider if SPO2 < 94%    GI  #Nutrition/Fluids/Electrolytes   - replete K<4 and Mg <2  - Diet: regular    Endocrine  #DM  - A1C results: pending   - TSH results: pending     DVT Prophylaxis  - lovenox sq for DVT prophylaxis   - SCDs for DVT prophylaxis     Dispo: pending PT/OT evaluation     pending home med rec - need to get into contact with family     Discussed daily hospital plans and goals with patient and family at bedside.     Discussed with Neurology Attending 58y Female with PMHx of HTN, brought to the hospital for AMS and confusion. Patient was last seen normal by her cousin 72h ago, she noticed facial asymmetry, but thought it is related to recent dental manipulation. She also noted that she is confused. She tried calling her the next day, and patient didn't answer so she got concerned as patient always pickup her phone. Patient was found altered, and confused. In ED stroke code called initially then was cancelled as symptoms have been there >48h. NIHSS 9. CTH left frontal subcortical infarct. CTA  head showed Mild fusiform dilation of the right V4 artery to 4 mm as it traverses the foramen magnum without evidence of rupture, and 1 mm outpouchings superior to this focal dilation which may represent small saccular aneurysms or the origin of small caliber vessels. CTA neck negative. CTP showing no perfusion defect. Patient to be admitted to [facility] for further workup.    Neuro  #CVA workup  - continue aspirin 81mg   - consider adding plavix 75mg dailly  - continue atorvastatin 80mg daily  - q4hr stroke neuro checks and vitals  - obtain MRI Brain without contrast  - TTE with buble  - A1c, lipid panel, TSH  - PT/OT/speech/rehab  - DVT prophylaxis    #HTN  - permissive hypertension, Goal -180  - TTE with bubble    Neuro  #CVA workup  - continue aspirin 81mg and Plavix 75mg daily  - continue atorvastatin 80mg daily  - q4hr stroke neuro checks and vitals  - obtain MRI Brain without contrast  - Stroke Code HCT Results: No acute intracranial hemorrhage or demarcated transcortical infarction. eft frontal subcortical white matter infarct is favored to be chronic.  - Stroke Code CTA Results: Mild fusiform dilation of the right V4 artery to 4 mm as it traverses the foramen magnum without evidence of rupture. There are couple 1 mm outpouchings superior to this focal dilation which may represent small saccular aneurysms or the origin of small caliber vessels. Unremarkable CTA examination of the neck.  - Stroke education    Cards  #HTN  - permissive hypertension, Goal -180  - hold home blood pressure medication for now  - obtain TTE with bubble   - Stroke Code EKG Results: NSR    #HLD  - high dose statin as above in CVA  - LDL results: 155    Pulm  - call provider if SPO2 < 94%    GI  #Nutrition/Fluids/Electrolytes   - replete K<4 and Mg <2  - Diet: regular    Endocrine  #DM  - A1C results: pending   - TSH results: pending     DVT Prophylaxis  - heparin sq for DVT prophylaxis   - SCDs for DVT prophylaxis     Dispo: pending PT/OT evaluation     pending home med rec - need to get into contact with family     Discussed daily hospital plans and goals with patient and family at bedside.     Discussed with Neurology Attending

## 2021-07-24 NOTE — PHYSICAL THERAPY INITIAL EVALUATION ADULT - FOLLOWS COMMANDS/ANSWERS QUESTIONS, REHAB EVAL
~60% multi steps command,  +expressive aphasia, able to name object telephone, watch, and pen, unable to name television, slurred speech/100% of the time/aphasia

## 2021-07-24 NOTE — SPEECH LANGUAGE PATHOLOGY EVALUATION - SLP PERTINENT HISTORY OF CURRENT PROBLEM
PMHx of HTN, brought to the hospital for AMS and confusion. Admitted for stroke w/u.  CTH left frontal subcortical infarct. CTA  head showed Mild fusiform dilation of the right V4 artery to 4 mm as it traverses the foramen magnum without evidence of rupture, and 1 mm outpouchings superior to this focal dilation which may represent small saccular aneurysms or the origin of small caliber vessels.

## 2021-07-24 NOTE — ED PROVIDER NOTE - CLINICAL SUMMARY MEDICAL DECISION MAKING FREE TEXT BOX
57 y/o F pt with acute CVA who is out of stroke code window as deficits were greater than 48 hours. No tPA or acute interventions given. Stroke team advised to give aspirin and to admit to neurology for further stroke evaluation.

## 2021-07-24 NOTE — SPEECH LANGUAGE PATHOLOGY EVALUATION - COMMENTS
Convergent Namin%; suspect poor comprehension of task directions  Cookie Theft Picture Description Task: WH- orientation questions:   Name: able to write first name ind. and last name given phonemic cues; eventually said name with unison speech   Situation: "They were searching...They saying I was...I was just down."  State: oriented given binary verbal choices  Month: Verbalized "12,000," but wrote accurate response   Year: "20 20 20 2021"  Age: wrote '66' Convergent Namin%; suspect poor comprehension of task directions  Milly Theft Picture Description Task- example of utterances: "He men tell the time...washing dishes...doing the floor. He have cookies...some of it. He's be on the chair. She's laughing. He's eating it up. She's talking to the...the...phone."   Increased word retrieval with phonemic cues.    Verbal output was reduced with occasional disfluencies and revisions d/t anomia. Grammatical errors noted with incorrect prepositions (to/on) and verb tenses (e.g. have/has). Did not assess d/t time constraints Goals:   Pt will order preferred food and drink items given rare minimal verbal cues.   Pt will provide identifying information with 90% accuracy given rare minimal verbal cues.   Pt will use functional phrases/sentences to request basic wants/needs during 80% of opportunities given occasional minimal verbal cues.      Additional goals to be added upon completion of reading and writing assessment. Limited assessment d/t time constraints

## 2021-07-24 NOTE — PHYSICAL THERAPY INITIAL EVALUATION ADULT - GENERAL OBSERVATIONS, REHAB EVAL
Pt received semi supine in bed with +hep-lock, +EKG, NAD. Pt left as found, NAD, call brantley in reach, CATY scherer.

## 2021-07-24 NOTE — SPEECH LANGUAGE PATHOLOGY EVALUATION - SLP DIAGNOSIS
Pt presents with moderate non-fluent aphasia in setting of acute CVA. Speech is slow and halting with short/incomplete utterances and grammatical errors. Word-finding difficulties noted with occasional semantic paraphasias. Pt benefited from verbal cues to improve word finding. Receptive language skills are relatively intact for at least basic conversation. Further assessment of reading and writing skills is warranted prior to determining proficiency.

## 2021-07-24 NOTE — ED PROVIDER NOTE - OBJECTIVE STATEMENT
59 y/o F pt w/ PMHx of HTN, colon polyps, presents to the ED after being encouraged by cousin who found her confused and aphasic with facial droop in apartment today. As per cousin, pt has been like this for 3 days. She has not been seen by anyone in person and stopped answering her phone, which has not occurred before. Pt has difficulty communicating and contributing to history. Denies smoking. Was previously registered under Bhupendra Narvaez,  51, and patient ID of [id number]. 57 y/o F pt w/ PMHx of HTN, colon polyps, presents to the ED after being encouraged by cousin who found her confused and aphasic with facial droop in apartment today. As per cousin, pt has been like this for 3 days. She has not been seen by anyone in person and stopped answering her phone, which has not occurred before. Pt has difficulty communicating and contributing to history. Denies smoking. Actual identitiy , initially unknown at registration,  Bhupendra Narvaez,  51, and patient ID of [id number].

## 2021-07-24 NOTE — PHYSICAL THERAPY INITIAL EVALUATION ADULT - MODALITIES TREATMENT COMMENTS
CN Testing: B/L Frontalis intact; B/L buccinator intact; smile asymmetrical with R facial droop; tongue protrusion at midline; B/L eyes open/close intact; Shoulder elevation: intact bilaterally; Vision H-Test: pt able to track cross mid line, but requires max VCs from PT to track toward R side due to possible decreased attention. Vision Quadrant Test: intact bilaterally

## 2021-07-24 NOTE — PHYSICAL THERAPY INITIAL EVALUATION ADULT - ADDITIONAL COMMENTS
Pt lives with family in an apt with 1 flight walk up. Prior to admission, pt ambulated independently without AD.

## 2021-07-24 NOTE — H&P ADULT - ASSESSMENT
58y Female with PMHx of HTN, brought to the hospital for AMS and confusion. Patient was last seen normal by her cousin 72h ago, she noticed facial asymmetry, but thought it is related to recent dental manipulation. She also noted that she is confused. She tried calling her the next day, and patient didn't answer so she got concerned as patient always pickup her phone. Patient was found altered, and confused. In ED stroke code called initially then was cancelled as symptoms have been there >48h. NIHSS 9. CTH left frontal subcortical infarct. CTA  head showed Mild fusiform dilation of the right V4 artery to 4 mm as it traverses the foramen magnum without evidence of rupture, and 1 mm outpouchings superior to this focal dilation which may represent small saccular aneurysms or the origin of small caliber vessels. CTA neck negative. CTP showing no perfusion defect. Patient to be admitted to [facility] for further workup.  mg once given.    Neuro  #CVA workup  - s/p 325 ASA in ED  - continue aspirin 81mg and plavix 75mg daily  - continue atorvastatin 80mg daily  - q4hr stroke neuro checks and vitals  - obtain MRI Brain without contrast  - TTE with buble  - A1c, lipid panel, TSH  - PT/OT/rehab  - DVT prophylaxis    #HTN  - permissive hypertension, Goal -180  - TTE with bubble

## 2021-07-24 NOTE — H&P ADULT - NSHPLABSRESULTS_GEN_ALL_CORE
.  LABS:                         14.6   5.25  )-----------( 284      ( 23 Jul 2021 22:34 )             45.3     07-23    132<L>  |  99  |  26<H>  ----------------------------<  125<H>  See Note   |  21<L>  |  2.05<H>    Ca    9.8      23 Jul 2021 22:34    TPro  8.0  /  Alb  4.3  /  TBili  0.9  /  DBili  x   /  AST  See Note  /  ALT  See Note  /  AlkPhos  69  07-23    PT/INR - ( 23 Jul 2021 22:34 )   PT: 13.4 sec;   INR: 1.12          PTT - ( 23 Jul 2021 22:34 )  PTT:28.8 sec    CARDIAC MARKERS ( 23 Jul 2021 22:34 )  x     / 0.01 ng/mL / x     / x     / x                RADIOLOGY, EKG & ADDITIONAL TESTS: Reviewed.

## 2021-07-24 NOTE — PHYSICAL THERAPY INITIAL EVALUATION ADULT - GAIT DEVIATIONS NOTED, PT EVAL
slightly unsteady gait, increased lateral sway, no lose of balance, decreased heel strike and hip flexion on RLE during ambulaiton/decreased maryellen/increased time in double stance/decreased step length

## 2021-07-24 NOTE — H&P ADULT - NSHPPHYSICALEXAM_GEN_ALL_CORE
VITAL SIGNS:  T(C): 36.3 (07-23-21 @ 21:35), Max: 36.3 (07-23-21 @ 21:35)  T(F): 97.4 (07-23-21 @ 21:35), Max: 97.4 (07-23-21 @ 21:35)  HR: 90 (07-23-21 @ 21:35) (90 - 90)  BP: 138/94 (07-23-21 @ 21:35) (138/94 - 138/94)  BP(mean): --  RR: 16 (07-23-21 @ 21:35) (16 - 16)  SpO2: 99% (07-23-21 @ 21:35) (99% - 99%)  Wt(kg): --    PHYSICAL EXAM:    Constitutional: WDWN resting comfortably in bed; NAD  Head: NC/AT  Respiratory: CTA B/L; no W/R/R, no retractions  Cardiac: +S1/S2; RRR; no M/R/G; PMI non-displaced  Gastrointestinal: soft, NT/ND; no rebound or guarding; +BSx4  Neurologic: AAOx3; CNII-XII grossly intact; no focal deficits.   Mental status: Awake, alert, AOX2. Speech is slurred, dysarthria noted, severe expressive aphasia. Follows commands.  Face is asymmetric with right sided facial droop  Neglect noted over the right on simultaneous examination   Psychiatric: affect and characteristics of appearance, verbalizations, behaviors are appropriate

## 2021-07-24 NOTE — PHYSICAL THERAPY INITIAL EVALUATION ADULT - IMPAIRMENTS FOUND, PT EVAL
aerobic capacity/endurance/arousal, attention, and cognition/gait, locomotion, and balance/gross motor/muscle strength/posture

## 2021-07-24 NOTE — ED PROVIDER NOTE - NEUROLOGICAL, MLM
Right facial droop, normal strength, slightly unsteady gait, unable to perform sensation exam due to pt's inability to communicate.

## 2021-07-24 NOTE — SPEECH LANGUAGE PATHOLOGY EVALUATION - SLP AUTOMATIC SPEECH
Provided all days of the week and months of the year after 'Monday' and 'January' were provided for initiation/intact/days of the week/months of the year

## 2021-07-24 NOTE — H&P ADULT - HISTORY OF PRESENT ILLNESS
Last known well time/Time of onset of symptoms: 24-72 h  History obtained from cousin Brenda.    HPI: 58y Female with PMHx of HTN, brought to the hospital for AMS and confusion. Patient was last seen normal by her cousin 72h ago, she noticed facial asymmetry, but thought it is related to recent dental manipulation. She also noted that she is confused. She tried calling her the next day, and patient didn't answer so she got concerned as patient always pickup her phone. Patient was found altered, and confused. In ED stroke code called initially then was cancelled as symptoms have been there >48h. NIHSS 9. CTH left frontal subcortical infarct. CTA  head showed Mild fusiform dilation of the right V4 artery to 4 mm as it traverses the foramen magnum without evidence of rupture, and 1 mm outpouchings superior to this focal dilation which may represent small saccular aneurysms or the origin of small caliber vessels. CTA neck negative. CTP showing no perfusion defect. Patient to be admitted to [facility] for further workup.  mg once given.

## 2021-07-24 NOTE — SPEECH LANGUAGE PATHOLOGY EVALUATION - CONFRONTATIONAL NAMING
62% accuracy independently which increased to ~87% given min. verbal cues. Errors marked mostly by semantic paraphasias, both related and unrelated (e.g. jar/cup, water/window)./impaired

## 2021-07-24 NOTE — PHYSICAL THERAPY INITIAL EVALUATION ADULT - MANUAL MUSCLE TESTING RESULTS, REHAB EVAL
b/l UEs~4+/5 t/o except b/l elbow extension, b/l shoulder flexion ~4-/5, b/l LEs~4+/5 except R hip flexion~3+/5

## 2021-07-24 NOTE — CONSULT NOTE ADULT - ASSESSMENT
58yF w/Hx of HTN presenting for AMS, confusion, and facial asymmetry found to have expressive aphasia and L frontal subcortical infarct, NIHSS of 9    L frontal infarct - With expressive aphasia and facial asymmetry, NIHSS of 9 on presentation  - Management as per stroke service, allowing permissive HTN  - CTA w/mild fusiform dilation of the right V4 artery to 4 mm as it traverses the foramen magnum without evidence of rupture, and 1 mm outpouchings superior to this focal dilation which may represent small saccular aneurysms or the origin of small caliber vessels. CTA neck negative. CTP showing no perfusion defect.     Unclear medical history - Reported Hx of HTN, though unclear what medications patient takes at home, and family unaware  - Obtain med rec from pharmacy  - Hold antihypertensives for permissive HTN as per Stroke at this time 58yF w/Hx of HTN presenting for AMS, confusion, and facial asymmetry found to have expressive aphasia and L frontal subcortical infarct, NIHSS of 9    L frontal infarct - With expressive aphasia and facial asymmetry, NIHSS of 9 on presentation  - Management as per stroke service, allowing permissive HTN  - CTA w/mild fusiform dilation of the right V4 artery to 4 mm as it traverses the foramen magnum without evidence of rupture, and 1 mm outpouchings superior to this focal dilation which may represent small saccular aneurysms or the origin of small caliber vessels. CTA neck negative. CTP showing no perfusion defect.     Unclear medical history - Reported Hx of HTN, though unclear what medications patient takes at home, and family unaware  - Obtain med rec from pharmacy  - Hold antihypertensives for permissive HTN as per Stroke at this time    Elevated Cr - Improving since admission, presume pre-renal BRITT  - Continue to trend  - Avoid nephrotoxic agents

## 2021-07-24 NOTE — PROGRESS NOTE ADULT - SUBJECTIVE AND OBJECTIVE BOX
Neurology Stroke Progress Note    INTERVAL HPI/OVERNIGHT EVENTS:  Patient seen and examined by stroke ACP this morning while patient was eating breakfast. Patient with expressive aphasia. Asked patient's name, she stated "salad". Patient appeared frustrated by difficulty getting words out. Patient unable to state home medications, pharmacy or provide family number's phone number.     MEDICATIONS  (STANDING):  aspirin enteric coated 81 milliGRAM(s) Oral daily  atorvastatin 80 milliGRAM(s) Oral at bedtime  heparin   Injectable 5000 Unit(s) SubCutaneous every 8 hours    MEDICATIONS  (PRN):      Allergies    No Known Allergies    Intolerances        Vital Signs Last 24 Hrs  T(C): 36.7 (24 Jul 2021 09:32), Max: 36.7 (24 Jul 2021 01:23)  T(F): 98 (24 Jul 2021 09:32), Max: 98.1 (24 Jul 2021 01:23)  HR: 86 (24 Jul 2021 08:49) (82 - 96)  BP: 130/77 (24 Jul 2021 08:49) (127/74 - 143/86)  BP(mean): 98 (24 Jul 2021 08:49) (96 - 107)  RR: 18 (24 Jul 2021 08:49) (15 - 18)  SpO2: 99% (24 Jul 2021 08:49) (96% - 99%)    Physical exam:  General: No acute distress, awake and alert  Eyes: Anicteric sclerae, moist conjunctivae, see below for CNs  Cardiovascular: Regular rate and rhythm, no murmurs, rubs, or gallops. No carotid bruits.   Pulmonary: Anterior breath sounds clear bilaterally, no crackles or wheezing. No use of accessory muscles  GI: Abdomen soft, non-distended, non-tender  Extremities: Radial and DP pulses +2, no edema    Neurologic:  -Mental status: Awake, alert. mildly dysarthric, severe Expressive aphasia, able to name pen, watch, blue. Cannot state name. Follows commands. Appears frustrated and nodds head when asked if she is having difficulty finding the words she wants to say.  -Cranial nerves:   II: Visual fields are full to confrontation.  III, IV, VI: Extraocular movements are intact without nystagmus. Pupils equally round and reactive to light  V:  Facial sensation V1-V3 equal and intact   VII: right facial assymetry  Motor: Normal bulk and tone. No pronator drift. Strength bilateral upper extremity 5/5, bilateral lower extremities 5/5.  Rapid alternating movements intact and symmetric  Sensation: Intact to light touch bilaterally. Extinction to the left sided on double simultaneous testing.  Coordination: No dysmetria on finger-to-nose bilaterally   Reflexes: Downgoing toes bilaterally     LABS:                        14.1   3.70  )-----------( 257      ( 24 Jul 2021 07:48 )             44.6     07-24    139  |  103  |  21  ----------------------------<  122<H>  3.0<L>   |  21<L>  |  1.35<H>    Ca    9.9      24 Jul 2021 07:48  Phos  3.4     07-24  Mg     2.2     07-24    TPro  7.4  /  Alb  4.0  /  TBili  1.1  /  DBili  x   /  AST  33  /  ALT  18  /  AlkPhos  68  07-24    PT/INR - ( 23 Jul 2021 22:34 )   PT: 13.4 sec;   INR: 1.12          PTT - ( 23 Jul 2021 22:34 )  PTT:28.8 sec      RADIOLOGY & ADDITIONAL TESTS:     CT Angio Neck w/ IV Cont (07.23.21 @ 23:02) >  Mild fusiform dilation of the right V4 artery to 4 mm as it traverses the foramen magnum without evidence of rupture.  There are couple 1 mm outpouchings superior to this focal dilation which may represent small saccular aneurysms or the origin of small caliber vessels.    CT Angio Neck w/ IV Cont (07.23.21 @ 23:02) >  Unremarkable CTA examination of the neck.      CT Perfusion w/ Maps w/ IV Cont (07.23.21 @ 23:01) >  IMPRESSION:  Normal CT perfusion study.    CT Brain Stroke Protocol (07.23.21 @ 23:01) >  No acute intracranial hemorrhage or demarcated transcortical infarction.  Left frontal subcortical white matter infarct is favored to be subacute to chronic.   Neurology Stroke Progress Note    INTERVAL HPI/OVERNIGHT EVENTS:  Patient seen and examined by stroke ACP this morning while patient was eating breakfast. Patient with expressive aphasia. Asked patient's name, she stated "salad". Patient appeared frustrated by difficulty getting words out. Patient unable to state home medications, pharmacy or provide family number's phone number.     MEDICATIONS  (STANDING):  aspirin enteric coated 81 milliGRAM(s) Oral daily  atorvastatin 80 milliGRAM(s) Oral at bedtime  heparin   Injectable 5000 Unit(s) SubCutaneous every 8 hours    MEDICATIONS  (PRN):      Allergies    No Known Allergies    Intolerances        Vital Signs Last 24 Hrs  T(C): 36.7 (24 Jul 2021 09:32), Max: 36.7 (24 Jul 2021 01:23)  T(F): 98 (24 Jul 2021 09:32), Max: 98.1 (24 Jul 2021 01:23)  HR: 86 (24 Jul 2021 08:49) (82 - 96)  BP: 130/77 (24 Jul 2021 08:49) (127/74 - 143/86)  BP(mean): 98 (24 Jul 2021 08:49) (96 - 107)  RR: 18 (24 Jul 2021 08:49) (15 - 18)  SpO2: 99% (24 Jul 2021 08:49) (96% - 99%)    Physical exam:  General: No acute distress, awake and alert  Eyes: Anicteric sclerae, moist conjunctivae, see below for CNs  Cardiovascular: Regular rate and rhythm, no murmurs, rubs, or gallops. No carotid bruits.   Pulmonary: Anterior breath sounds clear bilaterally, no crackles or wheezing. No use of accessory muscles  GI: Abdomen soft, non-distended, non-tender  Extremities: Radial and DP pulses +2, no edema    Neurologic:  -Mental status: Awake, alert. mildly dysarthric, severe Expressive aphasia, able to name pen, watch, blue. Cannot state name. Follows commands. Appears frustrated and nodds head when asked if she is having difficulty finding the words she wants to say.  -Cranial nerves:   II: Visual fields are full to confrontation.  III, IV, VI: Extraocular movements are intact without nystagmus. Pupils equally round and reactive to light  V:  Facial sensation V1-V3 equal and intact   VII: right facial assymetry  Motor: Normal bulk and tone. Slight right pronator drift. RUE 4+/5, RLE 5-/5, LUE 5/5. LLE 5/5.  Rapid alternating movements intact and symmetric  Sensation: Intact to light touch bilaterally. Extinction to the left sided on double simultaneous testing.  Coordination: No dysmetria on finger-to-nose bilaterally   Reflexes: Downgoing toes bilaterally     LABS:                        14.1   3.70  )-----------( 257      ( 24 Jul 2021 07:48 )             44.6     07-24    139  |  103  |  21  ----------------------------<  122<H>  3.0<L>   |  21<L>  |  1.35<H>    Ca    9.9      24 Jul 2021 07:48  Phos  3.4     07-24  Mg     2.2     07-24    TPro  7.4  /  Alb  4.0  /  TBili  1.1  /  DBili  x   /  AST  33  /  ALT  18  /  AlkPhos  68  07-24    PT/INR - ( 23 Jul 2021 22:34 )   PT: 13.4 sec;   INR: 1.12          PTT - ( 23 Jul 2021 22:34 )  PTT:28.8 sec      RADIOLOGY & ADDITIONAL TESTS:     CT Angio Neck w/ IV Cont (07.23.21 @ 23:02) >  Mild fusiform dilation of the right V4 artery to 4 mm as it traverses the foramen magnum without evidence of rupture.  There are couple 1 mm outpouchings superior to this focal dilation which may represent small saccular aneurysms or the origin of small caliber vessels.    CT Angio Neck w/ IV Cont (07.23.21 @ 23:02) >  Unremarkable CTA examination of the neck.      CT Perfusion w/ Maps w/ IV Cont (07.23.21 @ 23:01) >  IMPRESSION:  Normal CT perfusion study.    CT Brain Stroke Protocol (07.23.21 @ 23:01) >  No acute intracranial hemorrhage or demarcated transcortical infarction.  Left frontal subcortical white matter infarct is favored to be subacute to chronic.

## 2021-07-25 LAB
ANION GAP SERPL CALC-SCNC: 10 MMOL/L — SIGNIFICANT CHANGE UP (ref 5–17)
BUN SERPL-MCNC: 19 MG/DL — SIGNIFICANT CHANGE UP (ref 7–23)
CALCIUM SERPL-MCNC: 10 MG/DL — SIGNIFICANT CHANGE UP (ref 8.4–10.5)
CHLORIDE SERPL-SCNC: 107 MMOL/L — SIGNIFICANT CHANGE UP (ref 96–108)
CK SERPL-CCNC: 574 U/L — HIGH (ref 25–170)
CO2 SERPL-SCNC: 23 MMOL/L — SIGNIFICANT CHANGE UP (ref 22–31)
CREAT SERPL-MCNC: 1.23 MG/DL — SIGNIFICANT CHANGE UP (ref 0.5–1.3)
GLUCOSE SERPL-MCNC: 111 MG/DL — HIGH (ref 70–99)
HCT VFR BLD CALC: 42.1 % — SIGNIFICANT CHANGE UP (ref 34.5–45)
HGB BLD-MCNC: 13.8 G/DL — SIGNIFICANT CHANGE UP (ref 11.5–15.5)
MAGNESIUM SERPL-MCNC: 2 MG/DL — SIGNIFICANT CHANGE UP (ref 1.6–2.6)
MCHC RBC-ENTMCNC: 27.3 PG — SIGNIFICANT CHANGE UP (ref 27–34)
MCHC RBC-ENTMCNC: 32.8 GM/DL — SIGNIFICANT CHANGE UP (ref 32–36)
MCV RBC AUTO: 83.4 FL — SIGNIFICANT CHANGE UP (ref 80–100)
NRBC # BLD: 0 /100 WBCS — SIGNIFICANT CHANGE UP (ref 0–0)
PHOSPHATE SERPL-MCNC: 3.4 MG/DL — SIGNIFICANT CHANGE UP (ref 2.5–4.5)
PLATELET # BLD AUTO: 260 K/UL — SIGNIFICANT CHANGE UP (ref 150–400)
POTASSIUM SERPL-MCNC: 3.5 MMOL/L — SIGNIFICANT CHANGE UP (ref 3.5–5.3)
POTASSIUM SERPL-SCNC: 3.5 MMOL/L — SIGNIFICANT CHANGE UP (ref 3.5–5.3)
RBC # BLD: 5.05 M/UL — SIGNIFICANT CHANGE UP (ref 3.8–5.2)
RBC # FLD: 14.7 % — HIGH (ref 10.3–14.5)
SODIUM SERPL-SCNC: 140 MMOL/L — SIGNIFICANT CHANGE UP (ref 135–145)
WBC # BLD: 2.59 K/UL — LOW (ref 3.8–10.5)
WBC # FLD AUTO: 2.59 K/UL — LOW (ref 3.8–10.5)

## 2021-07-25 PROCEDURE — 70544 MR ANGIOGRAPHY HEAD W/O DYE: CPT | Mod: 26,59

## 2021-07-25 PROCEDURE — 70547 MR ANGIOGRAPHY NECK W/O DYE: CPT | Mod: 26

## 2021-07-25 PROCEDURE — 70551 MRI BRAIN STEM W/O DYE: CPT | Mod: 26

## 2021-07-25 PROCEDURE — 99232 SBSQ HOSP IP/OBS MODERATE 35: CPT

## 2021-07-25 PROCEDURE — 99231 SBSQ HOSP IP/OBS SF/LOW 25: CPT

## 2021-07-25 PROCEDURE — 93010 ELECTROCARDIOGRAM REPORT: CPT

## 2021-07-25 RX ORDER — PANTOPRAZOLE SODIUM 20 MG/1
40 TABLET, DELAYED RELEASE ORAL
Refills: 0 | Status: DISCONTINUED | OUTPATIENT
Start: 2021-07-25 | End: 2021-08-02

## 2021-07-25 RX ORDER — CLOPIDOGREL BISULFATE 75 MG/1
75 TABLET, FILM COATED ORAL DAILY
Refills: 0 | Status: DISCONTINUED | OUTPATIENT
Start: 2021-07-25 | End: 2021-07-26

## 2021-07-25 RX ORDER — METOPROLOL TARTRATE 50 MG
25 TABLET ORAL DAILY
Refills: 0 | Status: DISCONTINUED | OUTPATIENT
Start: 2021-07-25 | End: 2021-08-02

## 2021-07-25 RX ADMIN — PANTOPRAZOLE SODIUM 40 MILLIGRAM(S): 20 TABLET, DELAYED RELEASE ORAL at 16:59

## 2021-07-25 RX ADMIN — HEPARIN SODIUM 5000 UNIT(S): 5000 INJECTION INTRAVENOUS; SUBCUTANEOUS at 22:00

## 2021-07-25 RX ADMIN — HEPARIN SODIUM 5000 UNIT(S): 5000 INJECTION INTRAVENOUS; SUBCUTANEOUS at 16:59

## 2021-07-25 RX ADMIN — ATORVASTATIN CALCIUM 80 MILLIGRAM(S): 80 TABLET, FILM COATED ORAL at 21:57

## 2021-07-25 RX ADMIN — Medication 25 MILLIGRAM(S): at 17:00

## 2021-07-25 RX ADMIN — CLOPIDOGREL BISULFATE 75 MILLIGRAM(S): 75 TABLET, FILM COATED ORAL at 16:59

## 2021-07-25 RX ADMIN — Medication 81 MILLIGRAM(S): at 11:38

## 2021-07-25 RX ADMIN — HEPARIN SODIUM 5000 UNIT(S): 5000 INJECTION INTRAVENOUS; SUBCUTANEOUS at 06:13

## 2021-07-25 NOTE — OCCUPATIONAL THERAPY INITIAL EVALUATION ADULT - PERTINENT HX OF CURRENT PROBLEM, REHAB EVAL
CONTINUE:   of the right V4 artery to 4 mm as it traverses the foramen magnum without evidence of rupture, and 1 mm outpouchings superior to this focal dilation which may represent small saccular aneurysms or the origin of small caliber vessels. CTA neck negative.

## 2021-07-25 NOTE — OCCUPATIONAL THERAPY INITIAL EVALUATION ADULT - LIVES WITH, PROFILE
Pt lives with roommate in apt with 1 flight of stairs to enter. Pt at baseline is ind for ADLs and functional mobility.

## 2021-07-25 NOTE — PROGRESS NOTE ADULT - SUBJECTIVE AND OBJECTIVE BOX
Neurology Stroke Progress Note    INTERVAL HPI/OVERNIGHT EVENTS:  Patient seen and examined this morning. Patient able to state name and that she is at the hospital, however, remains aphasic, and unable to state age and year. No events overnight.     MEDICATIONS  (STANDING):  aspirin enteric coated 81 milliGRAM(s) Oral daily  atorvastatin 80 milliGRAM(s) Oral at bedtime  heparin   Injectable 5000 Unit(s) SubCutaneous every 8 hours    MEDICATIONS  (PRN):      Allergies    No Known Allergies    Intolerances        Vital Signs Last 24 Hrs  T(C): 36.8 (25 Jul 2021 06:30), Max: 36.8 (24 Jul 2021 21:30)  T(F): 98.2 (25 Jul 2021 06:30), Max: 98.3 (24 Jul 2021 21:30)  HR: 98 (25 Jul 2021 08:35) (82 - 116)  BP: 145/67 (25 Jul 2021 08:35) (116/72 - 147/74)  BP(mean): 96 (25 Jul 2021 08:35) (87 - 99)  RR: 18 (25 Jul 2021 08:35) (16 - 18)  SpO2: 96% (25 Jul 2021 08:35) (96% - 100%)    Physical exam:  General: No acute distress, awake and alert  Eyes: Anicteric sclerae, moist conjunctivae, see below for CNs  Neck: trachea midline, FROM, supple, no thyromegaly or lymphadenopathy  Cardiovascular: Regular rate and rhythm, no murmurs, rubs, or gallops. No carotid bruits.   Pulmonary: Anterior breath sounds clear bilaterally, no crackles or wheezing. No use of accessory muscles  GI: Abdomen soft, non-distended, non-tender  Extremities: Radial and DP pulses +2, no edema    Neurologic:  -Mental status: Awake, alert, oriented to person, hospital. significant expressive aphasia, unable to state age or year. Follows command. Able to name simple objects such as purple, watch, key.   -Cranial nerves:   II: Visual fields are full to confrontation.  III, IV, VI: Extraocular movements are intact without nystagmus. Pupils equally round and reactive to light  V:  Facial sensation V1-V3 equal and intact   VII: right facial assymetry  Motor: Normal bulk and tone. + right pronator drift. RUE 4+/5, RLE 5-/5.  LUE 5/5, LLE 5/5.  Sensation: Intact to light touch bilaterally. Extinction to right on double simultaneous testing.    LABS:                        13.8   2.59  )-----------( 260      ( 25 Jul 2021 08:30 )             42.1     07-25    140  |  107  |  19  ----------------------------<  111<H>  3.5   |  23  |  1.23    Ca    10.0      25 Jul 2021 08:30  Phos  3.4     07-25  Mg     2.0     07-25    TPro  7.4  /  Alb  4.0  /  TBili  1.1  /  DBili  x   /  AST  33  /  ALT  18  /  AlkPhos  68  07-24    PT/INR - ( 23 Jul 2021 22:34 )   PT: 13.4 sec;   INR: 1.12          PTT - ( 23 Jul 2021 22:34 )  PTT:28.8 sec      RADIOLOGY & ADDITIONAL TESTS:    ** ADDENDUM MADE TO CTA     CT Angio Neck w/ IV Cont (07.23.21 @ 23:02) >  INTERPRETATION:  I, Gerry Weinstein MD, have reviewed the images and the report and agree with thefindings, with the following modifications:    IV contrast: 80 mL Isovue-370.    CTA Neck: There is a left paracentral disc herniation at C6-7 resulting in at least moderate spinal canal stenosis. MRI cervical spine can be obtained to evaluate for cord compression and cord signal abnormality.  There is calcified plaque of the left carotid bifurcation without significant stenosis per NASCET criteria.    CTA head: Agree that there is no large vessel occlusion. There is multifocal irregularity and narrowing involving the right vertebral artery, basilar artery and right posterior cerebral artery. Agree that there is focal irregularity of the proximal right V4 segment with peripheral calcifications. The focal outpouching described within the preliminary report may reflect incompletely mineralized atherosclerotic calcifications which can be further evaluated with outpatient MRA brain. There is focal moderate stenosis of the distal right V4 segment just after the origin of the right PICA (series9 image 45). There is mild to moderate stenosis of the mid basilar artery (series 9 image 42). There is moderate stenosis of the mid right P2 segment (series 9 image 42).    There is calcified plaque of the bilateral precavernous to supraclinoid segments resulting in mild stenosis of the supraclinoid left ICA.    There is junctional dilatation of the distal right M1 segment as it divides into 4 separate M2 branches (anatomic variation).    Findings were discussed with JOAO De Jesus by Dr. Gerry Weinstein on 7/25/2021 10:10 AM       Neurology Stroke Progress Note    INTERVAL HPI/OVERNIGHT EVENTS:  Patient seen and examined this morning. Patient able to state name and that she is at the hospital, however, remains aphasic, and unable to state age and year. No events overnight.     MEDICATIONS  (STANDING):  aspirin enteric coated 81 milliGRAM(s) Oral daily  atorvastatin 80 milliGRAM(s) Oral at bedtime  heparin   Injectable 5000 Unit(s) SubCutaneous every 8 hours    MEDICATIONS  (PRN):    Home medications:  - metoprolol succinate 25 mg daily   - omeprazole 40mg  daily   - Xarelto 15 mg daily - has not picked up since January need to confirm with PCP   - amlodipine 10 mg daily     Allergies    No Known Allergies    Intolerances        Vital Signs Last 24 Hrs  T(C): 36.8 (25 Jul 2021 06:30), Max: 36.8 (24 Jul 2021 21:30)  T(F): 98.2 (25 Jul 2021 06:30), Max: 98.3 (24 Jul 2021 21:30)  HR: 98 (25 Jul 2021 08:35) (82 - 116)  BP: 145/67 (25 Jul 2021 08:35) (116/72 - 147/74)  BP(mean): 96 (25 Jul 2021 08:35) (87 - 99)  RR: 18 (25 Jul 2021 08:35) (16 - 18)  SpO2: 96% (25 Jul 2021 08:35) (96% - 100%)    Physical exam:  General: No acute distress, awake and alert  Eyes: Anicteric sclerae, moist conjunctivae, see below for CNs  Neck: trachea midline, FROM, supple, no thyromegaly or lymphadenopathy  Cardiovascular: Regular rate and rhythm, no murmurs, rubs, or gallops. No carotid bruits.   Pulmonary: Anterior breath sounds clear bilaterally, no crackles or wheezing. No use of accessory muscles  GI: Abdomen soft, non-distended, non-tender  Extremities: Radial and DP pulses +2, no edema    Neurologic:  -Mental status: Awake, alert, oriented to person, hospital. significant expressive aphasia, unable to state age or year. Follows command. Able to name simple objects such as purple, watch, key.   -Cranial nerves:   II: Visual fields are full to confrontation.  III, IV, VI: Extraocular movements are intact without nystagmus. Pupils equally round and reactive to light  V:  Facial sensation V1-V3 equal and intact   VII: right facial assymetry  Motor: Normal bulk and tone. + right pronator drift. RUE 4+/5, RLE 5-/5.  LUE 5/5, LLE 5/5.  Sensation: Intact to light touch bilaterally. Extinction to right on double simultaneous testing.    LABS:                        13.8   2.59  )-----------( 260      ( 25 Jul 2021 08:30 )             42.1     07-25    140  |  107  |  19  ----------------------------<  111<H>  3.5   |  23  |  1.23    Ca    10.0      25 Jul 2021 08:30  Phos  3.4     07-25  Mg     2.0     07-25    TPro  7.4  /  Alb  4.0  /  TBili  1.1  /  DBili  x   /  AST  33  /  ALT  18  /  AlkPhos  68  07-24    PT/INR - ( 23 Jul 2021 22:34 )   PT: 13.4 sec;   INR: 1.12          PTT - ( 23 Jul 2021 22:34 )  PTT:28.8 sec      RADIOLOGY & ADDITIONAL TESTS:    ** ADDENDUM MADE TO CTA     CT Angio Neck w/ IV Cont (07.23.21 @ 23:02) >  INTERPRETATION:  I, Gerry Weinstein MD, have reviewed the images and the report and agree with thefindings, with the following modifications:    IV contrast: 80 mL Isovue-370.    CTA Neck: There is a left paracentral disc herniation at C6-7 resulting in at least moderate spinal canal stenosis. MRI cervical spine can be obtained to evaluate for cord compression and cord signal abnormality.  There is calcified plaque of the left carotid bifurcation without significant stenosis per NASCET criteria.    CTA head: Agree that there is no large vessel occlusion. There is multifocal irregularity and narrowing involving the right vertebral artery, basilar artery and right posterior cerebral artery. Agree that there is focal irregularity of the proximal right V4 segment with peripheral calcifications. The focal outpouching described within the preliminary report may reflect incompletely mineralized atherosclerotic calcifications which can be further evaluated with outpatient MRA brain. There is focal moderate stenosis of the distal right V4 segment just after the origin of the right PICA (series9 image 45). There is mild to moderate stenosis of the mid basilar artery (series 9 image 42). There is moderate stenosis of the mid right P2 segment (series 9 image 42).    There is calcified plaque of the bilateral precavernous to supraclinoid segments resulting in mild stenosis of the supraclinoid left ICA.    There is junctional dilatation of the distal right M1 segment as it divides into 4 separate M2 branches (anatomic variation).    Findings were discussed with JOAO De Jesus by Dr. Gerry Weinstein on 7/25/2021 10:10 AM

## 2021-07-25 NOTE — OCCUPATIONAL THERAPY INITIAL EVALUATION ADULT - MODALITIES TREATMENT COMMENTS
Cranial Nerves: R facial droop with asymmetrical smile and deviated tongue. Pt performed functional mobility bedside ~5 steps- deferred further ambulation 2/2 increase HR.

## 2021-07-25 NOTE — PROGRESS NOTE ADULT - ASSESSMENT
58yF w/Hx of HTN presenting for AMS, confusion, and facial asymmetry found to have expressive aphasia and L frontal subcortical infarct, NIHSS of 9    L frontal infarct - With expressive aphasia and facial asymmetry, NIHSS of 9 on presentation  - Management as per stroke service, allowing permissive HTN  - CTA w/mild fusiform dilation of the right V4 artery to 4 mm as it traverses the foramen magnum without evidence of rupture, and 1 mm outpouchings superior to this focal dilation which may represent small saccular aneurysms or the origin of small caliber vessels. CTA neck negative. CTP showing no perfusion defect.     Unclear medical history - Reported Hx of HTN, though unclear what medications patient takes at home, and family unaware  - Med rec from pharmacy showing Rx for Metoprolol and Xarelto, though Xarelto not filled in many months, raising question of underlying A fib diagnosis  - Hold antihypertensives for permissive HTN as per Stroke at this time  - Would continue telemetry monitoring - currently in sinus, would hold Metoprolol until A fib declared on tele or collateral obtained from PMD    Elevated Cr - Improving since admission, presume pre-renal BRITT  - Continue to trend  - Avoid nephrotoxic agents

## 2021-07-25 NOTE — OCCUPATIONAL THERAPY INITIAL EVALUATION ADULT - DIAGNOSIS, OT EVAL
Pt p/w expressive aphagia +facial droop demonstrating decrease balance and strength affecting ADLs and functional mobility.

## 2021-07-25 NOTE — PROGRESS NOTE ADULT - SUBJECTIVE AND OBJECTIVE BOX
HOSPITALIST MEDICINE PROGRESS NOTE  Patient seen and examined at bedside. Sitting up eating breakfast in NAD. Expressive aphasia remains.    PERTINENT REVIEW OF SYSTEMS:  Unable to reliably obtain    Allergies    No Known Allergies    Intolerances      MEDICATIONS:  MEDICATIONS  (STANDING):  aspirin enteric coated 81 milliGRAM(s) Oral daily  atorvastatin 80 milliGRAM(s) Oral at bedtime  heparin   Injectable 5000 Unit(s) SubCutaneous every 8 hours  pantoprazole    Tablet 40 milliGRAM(s) Oral before breakfast    MEDICATIONS  (PRN):    Vital Signs Last 24 Hrs  T(C): 36.4 (25 Jul 2021 10:44), Max: 36.8 (24 Jul 2021 21:30)  T(F): 97.5 (25 Jul 2021 10:44), Max: 98.3 (24 Jul 2021 21:30)  HR: 96 (25 Jul 2021 11:45) (82 - 116)  BP: 127/79 (25 Jul 2021 11:45) (121/66 - 147/74)  BP(mean): 94 (25 Jul 2021 11:45) (87 - 99)  RR: 18 (25 Jul 2021 11:45) (16 - 18)  SpO2: 99% (25 Jul 2021 11:45) (96% - 99%)    07-24 @ 07:01  -  07-25 @ 07:00  --------------------------------------------------------  IN: 600 mL / OUT: 0 mL / NET: 600 mL      PHYSICAL EXAM:  General: Well developed; well nourished; in no acute distress  Eyes: Anicteric sclerae, moist conjunctivae, PERRLA, EOMI  HENT: Moist mucous membranes; R sided droop  Neck: Trachea midline, supple  Lungs: Normal respiratory effort, no intercostal retractions  Cardiovascular: RRR  Abdomen: Soft, non-tender non-distended; Normal bowel sounds; No rebound or guarding  Extremities: R sided facial droop  Neurological: Facial asymmetry, expressive aphasia  Skin: Warm and dry. No obvious rash    LABS:                        13.8   2.59  )-----------( 260      ( 25 Jul 2021 08:30 )             42.1     07-25    140  |  107  |  19  ----------------------------<  111<H>  3.5   |  23  |  1.23    Ca    10.0      25 Jul 2021 08:30  Phos  3.4     07-25  Mg     2.0     07-25    TPro  7.4  /  Alb  4.0  /  TBili  1.1  /  DBili  x   /  AST  33  /  ALT  18  /  AlkPhos  68  07-24    PT/INR - ( 23 Jul 2021 22:34 )   PT: 13.4 sec;   INR: 1.12          PTT - ( 23 Jul 2021 22:34 )  PTT:28.8 sec                  RADIOLOGY & ADDITIONAL STUDIES:  Reviewed

## 2021-07-25 NOTE — PROGRESS NOTE ADULT - ASSESSMENT
58y Female with PMHx of HTN, brought to the hospital for AMS and confusion. Patient was last seen normal by her cousin 72h ago, she noticed facial asymmetry, but thought it is related to recent dental manipulation. She also noted that she is confused. She tried calling her the next day, and patient didn't answer so she got concerned as patient always pickup her phone. Patient was found altered, and confused. In ED stroke code called initially then was cancelled as symptoms have been there >48h. NIHSS 9. CTH left frontal subcortical infarct. CTA  head showed Mild fusiform dilation of the right V4 artery to 4 mm as it traverses the foramen magnum without evidence of rupture, and 1 mm outpouchings superior to this focal dilation which may represent small saccular aneurysms or the origin of small caliber vessels. CTA neck negative. CTP showing no perfusion defect. Patient to be admitted to [facility] for further workup.    Neuro  #CVA workup  - continue aspirin 81mg  - consider adding plavix - pending MRA of head and neck   - continue atorvastatin 80mg daily  - q4hr stroke neuro checks and vitals  - obtain MRI Brain without contrast  - TTE with buble  - A1c, lipid panel, TSH  - PT/OT/speech/rehab  - DVT prophylaxis    #HTN  - permissive hypertension, Goal -180  - TTE with bubble    Neuro  #CVA workup  - continue aspirin 81mg and Plavix 75mg daily  - continue atorvastatin 80mg daily  - q4hr stroke neuro checks and vitals  - obtain MRI Brain without contrast  - Stroke Code HCT Results: No acute intracranial hemorrhage or demarcated transcortical infarction. eft frontal subcortical white matter infarct is favored to be chronic.  - Stroke Code CTA Results: Mild fusiform dilation of the right V4 artery to 4 mm as it traverses the foramen magnum without evidence of rupture. There are couple 1 mm outpouchings superior to this focal dilation which may represent small saccular aneurysms or the origin of small caliber vessels. Unremarkable CTA examination of the neck.  ** addendum to CTA on 7/25: left paracentral disc herniation at C6-7 resulting in at least moderate spinal canal stenosis.   There is multifocal irregularity and narrowing involving the right vertebral artery, basilar artery and right posterior cerebral artery. Agree that there is focal irregularity of the proximal right V4 segment with peripheral calcifications. The focal outpouching described within the preliminary report may reflect incompletely mineralized atherosclerotic calcifications which can be further evaluated with outpatient MRA brain.  - Obtain MRA head and neck to further evaluate CTA findings   - Obtain MR cervical spine to rule out cord compression (although does not have clinical symptoms)  - Stroke education    Cards  #HTN  - permissive hypertension, Goal -180  - hold home blood pressure medication for now  - obtain TTE with bubble   - Stroke Code EKG Results: NSR    #HLD  - high dose statin as above in CVA  - LDL results: 155    Pulm  - call provider if SPO2 < 94%    GI  #Nutrition/Fluids/Electrolytes   - replete K<4 and Mg <2  - Diet: DASH    Endocrine  #DM  - A1C results: 6.3  - TSH results: 0.802    DVT Prophylaxis  - heparin sq for DVT prophylaxis   - SCDs for DVT prophylaxis     Dispo: [place]     pending home med rec - attempting to obtain correct pharmacy    Discussed daily hospital plans and goals with patient and family at bedside.     Discussed with Neurology Attending   58y Female with PMHx of HTN, brought to the hospital for AMS and confusion. Patient was last seen normal by her cousin 72h ago, she noticed facial asymmetry, but thought it is related to recent dental manipulation. She also noted that she is confused. She tried calling her the next day, and patient didn't answer so she got concerned as patient always pickup her phone. Patient was found altered, and confused. In ED stroke code called initially then was cancelled as symptoms have been there >48h. NIHSS 9. CTH left frontal subcortical infarct. CTA  head showed Mild fusiform dilation of the right V4 artery to 4 mm as it traverses the foramen magnum without evidence of rupture, and 1 mm outpouchings superior to this focal dilation which may represent small saccular aneurysms or the origin of small caliber vessels. CTA neck negative. CTP showing no perfusion defect. Patient to be admitted to [facility] for further workup.    Neuro  Neuro  #CVA workup  - continue aspirin 81mg   - consider adding plavix pending MRA result  - called pharmacy for med rec - patient taking metoprolol and was taken Xaretlo (however, has not picked up since January) - attempted to call PCP to confirm if patient has afib, however, unable to reach.   - continue atorvastatin 80mg daily  - q4hr stroke neuro checks and vitals  - obtain MRI Brain without contrast   - A1C 6.3, TSH 0.802, - Stroke Code HCT Results: No acute intracranial hemorrhage or demarcated transcortical infarction. eft frontal subcortical white matter infarct is favored to be chronic.  - Stroke Code CTA Results: Mild fusiform dilation of the right V4 artery to 4 mm as it traverses the foramen magnum without evidence of rupture. There are couple 1 mm outpouchings superior to this focal dilation which may represent small saccular aneurysms or the origin of small caliber vessels. Unremarkable CTA examination of the neck.  ** addendum to CTA on 7/25: left paracentral disc herniation at C6-7 resulting in at least moderate spinal canal stenosis.   There is multifocal irregularity and narrowing involving the right vertebral artery, basilar artery and right posterior cerebral artery. Agree that there is focal irregularity of the proximal right V4 segment with peripheral calcifications. The focal outpouching described within the preliminary report may reflect incompletely mineralized atherosclerotic calcifications which can be further evaluated with outpatient MRA brain.  - Obtain MRA head and neck to further evaluate CTA findings   - Obtain MR cervical spine to rule out cord compression (although does not have clinical symptoms)  - Stroke education    #HTN  - permissive hypertension, Goal -180  - TTE with bubble    Cards  #HTN  - permissive hypertension, Goal -180  - hold home blood pressure medication for now  - obtain TTE with bubble   - obtain BEKAH   - Stroke Code EKG Results: NSR    #HLD  - high dose statin as above in CVA  - LDL results: 155    Pulm  - call provider if SPO2 < 94%    GI  #Nutrition/Fluids/Electrolytes   - replete K<4 and Mg <2  - Diet: DASH    Endocrine  #DM  - A1C results: 6.3  - TSH results: 0.802    DVT Prophylaxis  - heparin sq for DVT prophylaxis   - SCDs for DVT prophylaxis     Dispo: [place]     Discussed daily hospital plans and goals with patient and family at bedside.     Discussed with Neurology Attending   58y Female with PMHx of HTN, brought to the hospital for AMS and confusion. Patient was last seen normal by her cousin 72h ago, she noticed facial asymmetry, but thought it is related to recent dental manipulation. She also noted that she is confused. She tried calling her the next day, and patient didn't answer so she got concerned as patient always pickup her phone. Patient was found altered, and confused. In ED stroke code called initially then was cancelled as symptoms have been there >48h. NIHSS 9. CTH left frontal subcortical infarct. CTA  head showed Mild fusiform dilation of the right V4 artery to 4 mm as it traverses the foramen magnum without evidence of rupture, and 1 mm outpouchings superior to this focal dilation which may represent small saccular aneurysms or the origin of small caliber vessels. CTA neck negative. CTP showing no perfusion defect. Patient to be admitted to [facility] for further workup.    Neuro  Neuro  #CVA workup  - continue aspirin 81mg   - add plavix 75mg today  - called pharmacy for med rec - patient taking metoprolol and was taken Xaretlo (however, has not picked up since January) - attempted to call PCP to confirm if patient has afib, however, unable to reach.   - continue atorvastatin 80mg daily  - q4hr stroke neuro checks and vitals  - obtain MRI Brain without contrast   - A1C 6.3, TSH 0.802, - Stroke Code HCT Results: No acute intracranial hemorrhage or demarcated transcortical infarction. eft frontal subcortical white matter infarct is favored to be chronic.  - Stroke Code CTA Results: Mild fusiform dilation of the right V4 artery to 4 mm as it traverses the foramen magnum without evidence of rupture. There are couple 1 mm outpouchings superior to this focal dilation which may represent small saccular aneurysms or the origin of small caliber vessels. Unremarkable CTA examination of the neck.  ** addendum to CTA on 7/25: left paracentral disc herniation at C6-7 resulting in at least moderate spinal canal stenosis.   There is multifocal irregularity and narrowing involving the right vertebral artery, basilar artery and right posterior cerebral artery. Agree that there is focal irregularity of the proximal right V4 segment with peripheral calcifications. The focal outpouching described within the preliminary report may reflect incompletely mineralized atherosclerotic calcifications which can be further evaluated with outpatient MRA brain.  - Obtain MRA head and neck to further evaluate CTA findings   - no need for MR cervical spine as patient does not have symptoms of cord compression  - Stroke education    #HTN  - permissive hypertension, Goal -180  - TTE with bubble    Cards  #HTN  - permissive hypertension, Goal -180  - hold home blood pressure medication for now  - obtain TTE with bubble   - obtain BEKAH   - Stroke Code EKG Results: NSR    #HLD  - high dose statin as above in CVA  - LDL results: 155    Pulm  - call provider if SPO2 < 94%    GI  #Nutrition/Fluids/Electrolytes   - replete K<4 and Mg <2  - Diet: DASH    Endocrine  #DM  - A1C results: 6.3  - TSH results: 0.802    DVT Prophylaxis  - heparin sq for DVT prophylaxis   - SCDs for DVT prophylaxis     Dispo: AR     Discussed daily hospital plans and goals with patient and family at bedside.     Discussed with Neurology Attending

## 2021-07-25 NOTE — OCCUPATIONAL THERAPY INITIAL EVALUATION ADULT - MD ORDER
58y Female with PMHx of HTN, brought to the hospital for AMS and confusion. Patient was last seen normal by her cousin 72h ago, she noticed facial asymmetry, but thought it is related to recent dental manipulation.  Patient was found altered, and confused. In ED stroke code called initially then was cancelled as symptoms have been there >48h. NIHSS 9. CTH left frontal subcortical infarct. CTA  head showed Mild fusiform dilation

## 2021-07-26 LAB
ANION GAP SERPL CALC-SCNC: 11 MMOL/L — SIGNIFICANT CHANGE UP (ref 5–17)
BUN SERPL-MCNC: 23 MG/DL — SIGNIFICANT CHANGE UP (ref 7–23)
CALCIUM SERPL-MCNC: 9.7 MG/DL — SIGNIFICANT CHANGE UP (ref 8.4–10.5)
CHLORIDE SERPL-SCNC: 105 MMOL/L — SIGNIFICANT CHANGE UP (ref 96–108)
CO2 SERPL-SCNC: 22 MMOL/L — SIGNIFICANT CHANGE UP (ref 22–31)
CREAT SERPL-MCNC: 1.55 MG/DL — HIGH (ref 0.5–1.3)
GLUCOSE SERPL-MCNC: 104 MG/DL — HIGH (ref 70–99)
HCT VFR BLD CALC: 38.6 % — SIGNIFICANT CHANGE UP (ref 34.5–45)
HGB BLD-MCNC: 12.5 G/DL — SIGNIFICANT CHANGE UP (ref 11.5–15.5)
MAGNESIUM SERPL-MCNC: 1.9 MG/DL — SIGNIFICANT CHANGE UP (ref 1.6–2.6)
MCHC RBC-ENTMCNC: 27.2 PG — SIGNIFICANT CHANGE UP (ref 27–34)
MCHC RBC-ENTMCNC: 32.4 GM/DL — SIGNIFICANT CHANGE UP (ref 32–36)
MCV RBC AUTO: 84.1 FL — SIGNIFICANT CHANGE UP (ref 80–100)
NRBC # BLD: 0 /100 WBCS — SIGNIFICANT CHANGE UP (ref 0–0)
PHOSPHATE SERPL-MCNC: 3.4 MG/DL — SIGNIFICANT CHANGE UP (ref 2.5–4.5)
PLATELET # BLD AUTO: 232 K/UL — SIGNIFICANT CHANGE UP (ref 150–400)
POTASSIUM SERPL-MCNC: 3.3 MMOL/L — LOW (ref 3.5–5.3)
POTASSIUM SERPL-SCNC: 3.3 MMOL/L — LOW (ref 3.5–5.3)
RBC # BLD: 4.59 M/UL — SIGNIFICANT CHANGE UP (ref 3.8–5.2)
RBC # FLD: 14.7 % — HIGH (ref 10.3–14.5)
SODIUM SERPL-SCNC: 138 MMOL/L — SIGNIFICANT CHANGE UP (ref 135–145)
WBC # BLD: 2.99 K/UL — LOW (ref 3.8–10.5)
WBC # FLD AUTO: 2.99 K/UL — LOW (ref 3.8–10.5)

## 2021-07-26 PROCEDURE — 99233 SBSQ HOSP IP/OBS HIGH 50: CPT

## 2021-07-26 RX ORDER — POTASSIUM CHLORIDE 20 MEQ
40 PACKET (EA) ORAL EVERY 4 HOURS
Refills: 0 | Status: COMPLETED | OUTPATIENT
Start: 2021-07-26 | End: 2021-07-26

## 2021-07-26 RX ORDER — APIXABAN 2.5 MG/1
5 TABLET, FILM COATED ORAL EVERY 12 HOURS
Refills: 0 | Status: DISCONTINUED | OUTPATIENT
Start: 2021-07-26 | End: 2021-08-02

## 2021-07-26 RX ADMIN — Medication 40 MILLIEQUIVALENT(S): at 13:05

## 2021-07-26 RX ADMIN — Medication 25 MILLIGRAM(S): at 06:44

## 2021-07-26 RX ADMIN — ATORVASTATIN CALCIUM 80 MILLIGRAM(S): 80 TABLET, FILM COATED ORAL at 21:30

## 2021-07-26 RX ADMIN — Medication 40 MILLIEQUIVALENT(S): at 10:04

## 2021-07-26 RX ADMIN — PANTOPRAZOLE SODIUM 40 MILLIGRAM(S): 20 TABLET, DELAYED RELEASE ORAL at 06:44

## 2021-07-26 RX ADMIN — Medication 40 MILLIEQUIVALENT(S): at 18:20

## 2021-07-26 RX ADMIN — APIXABAN 5 MILLIGRAM(S): 2.5 TABLET, FILM COATED ORAL at 18:21

## 2021-07-26 RX ADMIN — CLOPIDOGREL BISULFATE 75 MILLIGRAM(S): 75 TABLET, FILM COATED ORAL at 11:35

## 2021-07-26 RX ADMIN — Medication 81 MILLIGRAM(S): at 11:35

## 2021-07-26 NOTE — PROGRESS NOTE ADULT - SUBJECTIVE AND OBJECTIVE BOX
Neurology Stroke Progress Note    INTERVAL HPI/OVERNIGHT EVENTS:  Patient seen and examined. No events over night. e    MEDICATIONS  (STANDING):  aspirin enteric coated 81 milliGRAM(s) Oral daily  atorvastatin 80 milliGRAM(s) Oral at bedtime  clopidogrel Tablet 75 milliGRAM(s) Oral daily  heparin   Injectable 5000 Unit(s) SubCutaneous every 8 hours  metoprolol succinate ER 25 milliGRAM(s) Oral daily  pantoprazole    Tablet 40 milliGRAM(s) Oral before breakfast    MEDICATIONS  (PRN):      Home medications:  - metoprolol succinate 25 mg daily   - omeprazole 40mg  daily   - Xarelto 15 mg daily - has not picked up since January need to confirm with PCP   - amlodipine 10 mg daily     Allergies    No Known Allergies    Intolerances        Vital Signs Last 24 Hrs  T(C): 36.4 (26 Jul 2021 05:34), Max: 36.9 (26 Jul 2021 01:00)  T(F): 97.5 (26 Jul 2021 05:34), Max: 98.4 (26 Jul 2021 01:00)  HR: 70 (26 Jul 2021 04:37) (70 - 114)  BP: 128/63 (26 Jul 2021 04:37) (119/67 - 145/67)  BP(mean): 88 (26 Jul 2021 04:37) (87 - 97)  RR: 17 (26 Jul 2021 01:15) (17 - 18)  SpO2: 99% (26 Jul 2021 04:37) (96% - 100%)    Physical exam:  General: No acute distress, awake and alert  Eyes: Anicteric sclerae, moist conjunctivae, see below for CNs  Neck: trachea midline, FROM, supple, no thyromegaly or lymphadenopathy  Cardiovascular: Regular rate and rhythm, no murmurs, rubs, or gallops. No carotid bruits.   Pulmonary: Anterior breath sounds clear bilaterally, no crackles or wheezing. No use of accessory muscles  GI: Abdomen soft, non-distended, non-tender  Extremities: Radial and DP pulses +2, no edema    Neurologic:    Neurologic:  -Mental status: Awake, alert, oriented to person, hospital. significant expressive aphasia, unable to state age or year. Follows command. Able to name simple objects such as purple, watch, key.   -Cranial nerves:   II: Visual fields are full to confrontation.  III, IV, VI: Extraocular movements are intact without nystagmus. Pupils equally round and reactive to light  V:  Facial sensation V1-V3 equal and intact   VII: right facial assymetry  Motor: Normal bulk and tone. + right pronator drift. RUE 4+/5, RLE 5-/5.  LUE 5/5, LLE 5/5.  Sensation: Intact to light touch bilaterally. Extinction to right on double simultaneous testing.    LABS:                        13.8   2.59  )-----------( 260      ( 25 Jul 2021 08:30 )             42.1     07-26    138  |  105  |  23  ----------------------------<  104<H>  3.3<L>   |  22  |  1.55<H>    Ca    9.7      26 Jul 2021 06:45  Phos  3.4     07-26  Mg     1.9     07-26    TPro  7.4  /  Alb  4.0  /  TBili  1.1  /  DBili  x   /  AST  33  /  ALT  18  /  AlkPhos  68  07-24          RADIOLOGY & ADDITIONAL TESTS:    < from: MR Head No Cont (07.25.21 @ 14:53) >  Acute/subacute left frontal lobe infarction. No mass effect or midline shift. No evidence of hemorrhagic conversion.      < from: MR Head No Cont (07.25.21 @ 14:53) >  MRA BRAIN: No large vessel occlusion. Mild stenosis of the distal right vertebral artery, mild spinal stenosis of the mid basilar artery and moderate stenosis of the right PCA as seen on recent CTA. No evidence of aneurysm of the proximal right vertebral artery corresponding to questionable outpouching on CTA which was therefore favored to represent atherosclerotic plaque.  MRA NECK: No significant stenosis of the cervical carotid or vertebral arteries.    CT Angio Head w/ IV Cont (07.23.21 @ 23:02) >  Mild fusiform dilation of the right V4 artery to 4 mm as it traverses the foramen magnum without evidence of rupture.  There are couple 1 mm outpouchings superior to this focal dilation which may represent small saccular aneurysms or the origin of small caliber vessels.    < from: CT Angio Neck w/ IV Cont (07.23.21 @ 23:02) >  The left vertebral artery terminates in the PICA. There is mild fusiform dilation of the V4 artery as it traverses the foramen magnum without evidence of rupture. There are acouple 1 mm saccular dilatations seen immediately distal to this point.       from: CT Brain Stroke Protocol (07.23.21 @ 23:01) >  Left frontal subcortical white matter infarct is favored to be chronic.      IMPRESSION:  Normal CT perfusion study.    `       Neurology Stroke Progress Note    INTERVAL HPI/OVERNIGHT EVENTS:  Patient seen and examined. No events over night. Patient had A fib yesterday, started on oral metoprolol. No other events.  She wasn't cooperative during my examination.    MEDICATIONS  (STANDING):  aspirin enteric coated 81 milliGRAM(s) Oral daily  atorvastatin 80 milliGRAM(s) Oral at bedtime  clopidogrel Tablet 75 milliGRAM(s) Oral daily  heparin   Injectable 5000 Unit(s) SubCutaneous every 8 hours  metoprolol succinate ER 25 milliGRAM(s) Oral daily  pantoprazole    Tablet 40 milliGRAM(s) Oral before breakfast    MEDICATIONS  (PRN):      Home medications:  - metoprolol succinate 25 mg daily   - omeprazole 40mg  daily   - Xarelto 15 mg daily - has not picked up since January need to confirm with PCP   - amlodipine 10 mg daily     Allergies    No Known Allergies    Intolerances        Vital Signs Last 24 Hrs  T(C): 36.4 (26 Jul 2021 05:34), Max: 36.9 (26 Jul 2021 01:00)  T(F): 97.5 (26 Jul 2021 05:34), Max: 98.4 (26 Jul 2021 01:00)  HR: 70 (26 Jul 2021 04:37) (70 - 114)  BP: 128/63 (26 Jul 2021 04:37) (119/67 - 145/67)  BP(mean): 88 (26 Jul 2021 04:37) (87 - 97)  RR: 17 (26 Jul 2021 01:15) (17 - 18)  SpO2: 99% (26 Jul 2021 04:37) (96% - 100%)    Physical exam:  General: No acute distress, awake and alert  Eyes: Anicteric sclerae, moist conjunctivae, see below for CNs  Neck: trachea midline, FROM, supple, no thyromegaly or lymphadenopathy  Cardiovascular: Regular rate and rhythm, no murmurs, rubs, or gallops. No carotid bruits.   Pulmonary: Anterior breath sounds clear bilaterally, no crackles or wheezing. No use of accessory muscles  GI: Abdomen soft, non-distended, non-tender  Extremities: Radial and DP pulses +2, no edema    Neurologic:    Neurologic:  -Mental status: Awake, alert, oriented to person, hospital. significant expressive aphasia, unable to state age or year. Follows command. Able to name simple objects such as purple, watch, key.   -Cranial nerves:   II: Visual fields are full to confrontation.  III, IV, VI: Extraocular movements are intact without nystagmus. Pupils equally round and reactive to light  V:  Facial sensation V1-V3 equal and intact   VII: right facial assymetry  Motor: Normal bulk and tone. RUE 4+/5, RLE 5-/5.  LUE 5/5, LLE 5/5.  Sensation: Intact to light touch bilaterally. Extinction to right on double simultaneous testing.    LABS:                        13.8   2.59  )-----------( 260      ( 25 Jul 2021 08:30 )             42.1     07-26    138  |  105  |  23  ----------------------------<  104<H>  3.3<L>   |  22  |  1.55<H>    Ca    9.7      26 Jul 2021 06:45  Phos  3.4     07-26  Mg     1.9     07-26    TPro  7.4  /  Alb  4.0  /  TBili  1.1  /  DBili  x   /  AST  33  /  ALT  18  /  AlkPhos  68  07-24          RADIOLOGY & ADDITIONAL TESTS:    < from: MR Head No Cont (07.25.21 @ 14:53) >  Acute/subacute left frontal lobe infarction. No mass effect or midline shift. No evidence of hemorrhagic conversion.      < from: MR Head No Cont (07.25.21 @ 14:53) >  MRA BRAIN: No large vessel occlusion. Mild stenosis of the distal right vertebral artery, mild spinal stenosis of the mid basilar artery and moderate stenosis of the right PCA as seen on recent CTA. No evidence of aneurysm of the proximal right vertebral artery corresponding to questionable outpouching on CTA which was therefore favored to represent atherosclerotic plaque.  MRA NECK: No significant stenosis of the cervical carotid or vertebral arteries.    CT Angio Head w/ IV Cont (07.23.21 @ 23:02) >  Mild fusiform dilation of the right V4 artery to 4 mm as it traverses the foramen magnum without evidence of rupture.  There are couple 1 mm outpouchings superior to this focal dilation which may represent small saccular aneurysms or the origin of small caliber vessels.    < from: CT Angio Neck w/ IV Cont (07.23.21 @ 23:02) >  The left vertebral artery terminates in the PICA. There is mild fusiform dilation of the V4 artery as it traverses the foramen magnum without evidence of rupture. There are acouple 1 mm saccular dilatations seen immediately distal to this point.       from: CT Brain Stroke Protocol (07.23.21 @ 23:01) >  Left frontal subcortical white matter infarct is favored to be chronic.      IMPRESSION:  Normal CT perfusion study.    `

## 2021-07-26 NOTE — PROGRESS NOTE ADULT - ASSESSMENT
58y Female with PMHx of HTN, brought to the hospital for AMS and confusion. Patient was last seen normal by her cousin 72h ago, she noticed facial asymmetry, but thought it is related to recent dental manipulation. She also noted that she is confused. She tried calling her the next day, and patient didn't answer so she got concerned as patient always pickup her phone. Patient was found altered, and confused. In ED stroke code called initially then was cancelled as symptoms have been there >48h. NIHSS 9. CTH left frontal subcortical infarct. CTA  head showed Mild fusiform dilation of the right V4 artery to 4 mm as it traverses the foramen magnum without evidence of rupture, and 1 mm outpouchings superior to this focal dilation which may represent small saccular aneurysms or the origin of small caliber vessels. CTA neck negative. CTP showing no perfusion defect. Patient to be admitted to [facility] for further workup.    Neuro  #CVA workup  - continue aspirin 81mg   - add plavix 75mg today  - called pharmacy for med rec - patient taking metoprolol and was taken Xaretlo (however, has not picked up since January) - attempted to call PCP to confirm if patient has afib, however, unable to reach.   - continue atorvastatin 80mg daily  - q4hr stroke neuro checks and vitals  - obtain MRI Brain without contrast   - A1C 6.3, TSH 0.802,   - Stroke Code HCT Results: No acute intracranial hemorrhage or demarcated transcortical infarction. Left frontal subcortical white matter infarct is favored to be chronic.  - Stroke Code CTA Results: Mild fusiform dilation of the right V4 artery to 4 mm as it traverses the foramen magnum without evidence of rupture. There are couple 1 mm outpouchings superior to this focal dilation which may represent small saccular aneurysms or the origin of small caliber vessels. Unremarkable CTA examination of the neck.  ** addendum to CTA on 7/25: left paracentral disc herniation at C6-7 resulting in at least moderate spinal canal stenosis.   There is multifocal irregularity and narrowing involving the right vertebral artery, basilar artery and right posterior cerebral artery. Agree that there is focal irregularity of the proximal right V4 segment with peripheral calcifications. The focal outpouching described within the preliminary report may reflect incompletely mineralized atherosclerotic calcifications which can be further evaluated with outpatient MRA brain.  - Obtain MRA head and neck to further evaluate CTA findings -->nNo aneurysm  - no need for MR cervical spine as patient does not have symptoms of cord compression  - Stroke education    #HTN  - permissive hypertension, Goal -180  - TTE with bubble  t  Cards  #HTN  - permissive hypertension, Goal -180  - hold home blood pressure medication for now  - obtain TTE with bubble   - obtain BEKAH Pending echo?  - Stroke Code EKG Results: afib?    #HLD  - high dose statin as above in CVA  - LDL results: 155    Pulm  - call provider if SPO2 < 94%    GI  #Nutrition/Fluids/Electrolytes   - replete K<4 and Mg <2  - Diet: DASH    Endocrine  #DM  - A1C results: 6.3  - TSH results: 0.802    DVT Prophylaxis  - heparin sq for DVT prophylaxis   - SCDs for DVT prophylaxis     Dispo: AR     Discussed daily hospital plans and goals with patient and family at bedside.     Discussed with Neurology Attending   58y Female with PMHx of HTN, brought to the hospital for AMS and confusion. Patient was last seen normal by her cousin 72h ago, she noticed facial asymmetry, but thought it is related to recent dental manipulation. She also noted that she is confused. She tried calling her the next day, and patient didn't answer so she got concerned as patient always pickup her phone. Patient was found altered, and confused. In ED stroke code called initially then was cancelled as symptoms have been there >48h. NIHSS 9. CTH left frontal subcortical infarct. CTA  head showed Mild fusiform dilation of the right V4 artery to 4 mm as it traverses the foramen magnum without evidence of rupture, and 1 mm outpouchings superior to this focal dilation which may represent small saccular aneurysms or the origin of small caliber vessels. CTA neck negative. CTP showing no perfusion defect. Patient to be admitted to [facility] for further workup.    Neuro  #CVA workup  #Acute left frontal lobe infarction  - Etiology likely embolic given her hx of afib, and non compliance to medication  - Start Eliquis 5mg bid  - D/C DAPT  - called pharmacy for med rec - patient taking metoprolol and was taken Xaretlo (however, has not picked up since January) - attempted to call PCP to confirm if patient has afib, however, unable to reach.   - continue atorvastatin 80mg daily  - q4hr stroke neuro checks and vitals  - MRI showing acute left frontal lobe infarction   - A1C 6.3, TSH 0.802,   - Stroke Code HCT Results: No acute intracranial hemorrhage or demarcated transcortical infarction. Left frontal subcortical white matter infarct is favored to be chronic.  - Stroke Code CTA Results: Mild fusiform dilation of the right V4 artery to 4 mm as it traverses the foramen magnum without evidence of rupture. There are couple 1 mm outpouchings superior to this focal dilation which may represent small saccular aneurysms or the origin of small caliber vessels. Unremarkable CTA examination of the neck. left paracentral disc herniation at C6-7 resulting in at least moderate spinal canal stenosis.   - Obtain MRA head and neck to further evaluate CTA findings -->No aneurysm  - no need for MR cervical spine as patient does not have symptoms of cord compression  - Stroke education    #HTN  - permissive hypertension, Goal -180  - TTE with bubble    Cards  #HTN  - permissive hypertension, Goal -180  - hold home blood pressure medication for now  - obtain TTE with bubble   - Stroke Code EKG Results: afib?    #HLD  - high dose statin as above in CVA  - LDL results: 155    #BRITT on CKD?  -CPK noted to be elevated  -Urine lytes  -IVF if EF is normal    Pulm  - call provider if SPO2 < 94%    GI  #Nutrition/Fluids/Electrolytes   - replete K<4 and Mg <2  - Diet: DASH    Endocrine  #DM  - A1C results: 6.3  - TSH results: 0.802    DVT Prophylaxis  - heparin sq for DVT prophylaxis   - SCDs for DVT prophylaxis     Dispo: AR   Discussed daily hospital plans and goals with patient and family at bedside.   Discussed with Neurology Attending

## 2021-07-26 NOTE — PROGRESS NOTE ADULT - SUBJECTIVE AND OBJECTIVE BOX
HOSPITALIST MEDICINE PROGRESS NOTE  Patient seen and examined at bedside. Resting in bed. Expressive aphasia remains.    PERTINENT REVIEW OF SYSTEMS:  Unable to reliably obtain    Allergies    No Known Allergies    Intolerances      MEDICATIONS  (STANDING):  aspirin enteric coated 81 milliGRAM(s) Oral daily  atorvastatin 80 milliGRAM(s) Oral at bedtime  clopidogrel Tablet 75 milliGRAM(s) Oral daily  heparin   Injectable 5000 Unit(s) SubCutaneous every 8 hours  metoprolol succinate ER 25 milliGRAM(s) Oral daily  pantoprazole    Tablet 40 milliGRAM(s) Oral before breakfast  potassium chloride    Tablet ER 40 milliEquivalent(s) Oral every 4 hours    MEDICATIONS  (PRN):      Vital Signs Last 24 Hrs  T(C): 37.2 (26 Jul 2021 10:09), Max: 37.2 (26 Jul 2021 10:09)  T(F): 99 (26 Jul 2021 10:09), Max: 99 (26 Jul 2021 10:09)  HR: 70 (26 Jul 2021 08:30) (70 - 114)  BP: 124/75 (26 Jul 2021 08:30) (119/67 - 133/74)  BP(mean): 96 (26 Jul 2021 08:30) (87 - 97)  RR: 18 (26 Jul 2021 08:30) (17 - 18)  SpO2: 99% (26 Jul 2021 08:30) (96% - 100%)      PHYSICAL EXAM:  General: Well developed; well nourished; in no acute distress  Eyes: Anicteric sclerae, moist conjunctivae, PERRLA, EOMI  HENT: Moist mucous membranes; R sided droop  Neck: Trachea midline, supple  Lungs: Normal respiratory effort, no intercostal retractions  Cardiovascular: RRR  Abdomen: Soft, non-tender non-distended; Normal bowel sounds; No rebound or guarding  Extremities: R sided facial droop  Neurological: Facial asymmetry, expressive aphasia  Skin: Warm and dry. No obvious rash    LABS:                                   12.5   2.99  )-----------( 232      ( 26 Jul 2021 06:45 )             38.6   07-26    138  |  105  |  23  ----------------------------<  104<H>  3.3<L>   |  22  |  1.55<H>    Ca    9.7      26 Jul 2021 06:45  Phos  3.4     07-26  Mg     1.9     07-26                    RADIOLOGY & ADDITIONAL STUDIES:  Reviewed

## 2021-07-26 NOTE — PROGRESS NOTE ADULT - ASSESSMENT
58yF w/Hx of HTN presenting for AMS, confusion, and facial asymmetry found to have expressive aphasia and L frontal subcortical infarct, NIHSS of 9    L frontal infarct - With expressive aphasia and facial asymmetry, NIHSS of 9 on presentation  - Management as per stroke service, allowing permissive HTN  - CTA w/mild fusiform dilation of the right V4 artery to 4 mm as it traverses the foramen magnum without evidence of rupture, and 1 mm outpouchings superior to this focal dilation which may represent small saccular aneurysms or the origin of small caliber vessels. CTA neck negative. CTP showing no perfusion defect.     Unclear medical history - Reported Hx of HTN, though unclear what medications patient takes at home, and family unaware  - Med rec from pharmacy showing Rx for Metoprolol and Xarelto, though Xarelto not filled in many months, raising question of underlying A fib diagnosis  - Hold antihypertensives for permissive HTN as per Stroke at this time  - Would continue telemetry monitoring - currently in sinus, would hold Metoprolol until A fib declared on tele or collateral obtained from PMD    Elevated Cr-  bump today  - please f/u urine lytes   - if echocardiogram wnl, consider IV fluids if prerenal etiology  - strict I&O  - Avoid nephrotoxic agents

## 2021-07-27 ENCOUNTER — RESULT REVIEW (OUTPATIENT)
Age: 66
End: 2021-07-27

## 2021-07-27 LAB
ALBUMIN SERPL ELPH-MCNC: 3.7 G/DL — SIGNIFICANT CHANGE UP (ref 3.3–5)
ALP SERPL-CCNC: 64 U/L — SIGNIFICANT CHANGE UP (ref 40–120)
ALT FLD-CCNC: 22 U/L — SIGNIFICANT CHANGE UP (ref 10–45)
ANION GAP SERPL CALC-SCNC: 10 MMOL/L — SIGNIFICANT CHANGE UP (ref 5–17)
AST SERPL-CCNC: 39 U/L — SIGNIFICANT CHANGE UP (ref 10–40)
BASOPHILS # BLD AUTO: 0.02 K/UL — SIGNIFICANT CHANGE UP (ref 0–0.2)
BASOPHILS NFR BLD AUTO: 0.9 % — SIGNIFICANT CHANGE UP (ref 0–2)
BILIRUB SERPL-MCNC: 0.4 MG/DL — SIGNIFICANT CHANGE UP (ref 0.2–1.2)
BUN SERPL-MCNC: 23 MG/DL — SIGNIFICANT CHANGE UP (ref 7–23)
CALCIUM SERPL-MCNC: 9.5 MG/DL — SIGNIFICANT CHANGE UP (ref 8.4–10.5)
CHLORIDE SERPL-SCNC: 108 MMOL/L — SIGNIFICANT CHANGE UP (ref 96–108)
CK SERPL-CCNC: 450 U/L — HIGH (ref 25–170)
CO2 SERPL-SCNC: 21 MMOL/L — LOW (ref 22–31)
CREAT SERPL-MCNC: 1.38 MG/DL — HIGH (ref 0.5–1.3)
EOSINOPHIL # BLD AUTO: 0.04 K/UL — SIGNIFICANT CHANGE UP (ref 0–0.5)
EOSINOPHIL NFR BLD AUTO: 1.7 % — SIGNIFICANT CHANGE UP (ref 0–6)
GLUCOSE SERPL-MCNC: 98 MG/DL — SIGNIFICANT CHANGE UP (ref 70–99)
HCT VFR BLD CALC: 41.7 % — SIGNIFICANT CHANGE UP (ref 34.5–45)
HGB BLD-MCNC: 13 G/DL — SIGNIFICANT CHANGE UP (ref 11.5–15.5)
HIV 1+2 AB+HIV1 P24 AG SERPL QL IA: SIGNIFICANT CHANGE UP
LYMPHOCYTES # BLD AUTO: 0.28 K/UL — LOW (ref 1–3.3)
LYMPHOCYTES # BLD AUTO: 12.2 % — LOW (ref 13–44)
MCHC RBC-ENTMCNC: 27.1 PG — SIGNIFICANT CHANGE UP (ref 27–34)
MCHC RBC-ENTMCNC: 31.2 GM/DL — LOW (ref 32–36)
MCV RBC AUTO: 86.9 FL — SIGNIFICANT CHANGE UP (ref 80–100)
MONOCYTES # BLD AUTO: 0.28 K/UL — SIGNIFICANT CHANGE UP (ref 0–0.9)
MONOCYTES NFR BLD AUTO: 12.2 % — SIGNIFICANT CHANGE UP (ref 2–14)
NEUTROPHILS # BLD AUTO: 1.6 K/UL — LOW (ref 1.8–7.4)
NEUTROPHILS NFR BLD AUTO: 68.7 % — SIGNIFICANT CHANGE UP (ref 43–77)
PLATELET # BLD AUTO: 249 K/UL — SIGNIFICANT CHANGE UP (ref 150–400)
POTASSIUM SERPL-MCNC: 4.5 MMOL/L — SIGNIFICANT CHANGE UP (ref 3.5–5.3)
POTASSIUM SERPL-SCNC: 4.5 MMOL/L — SIGNIFICANT CHANGE UP (ref 3.5–5.3)
PROT SERPL-MCNC: 7 G/DL — SIGNIFICANT CHANGE UP (ref 6–8.3)
RBC # BLD: 4.8 M/UL — SIGNIFICANT CHANGE UP (ref 3.8–5.2)
RBC # FLD: 14.9 % — HIGH (ref 10.3–14.5)
SODIUM SERPL-SCNC: 139 MMOL/L — SIGNIFICANT CHANGE UP (ref 135–145)
WBC # BLD: 2.33 K/UL — LOW (ref 3.8–10.5)
WBC # FLD AUTO: 2.33 K/UL — LOW (ref 3.8–10.5)

## 2021-07-27 PROCEDURE — 88189 FLOWCYTOMETRY/READ 16 & >: CPT

## 2021-07-27 PROCEDURE — 93306 TTE W/DOPPLER COMPLETE: CPT | Mod: 26

## 2021-07-27 PROCEDURE — 99233 SBSQ HOSP IP/OBS HIGH 50: CPT

## 2021-07-27 RX ORDER — SODIUM CHLORIDE 9 MG/ML
1000 INJECTION, SOLUTION INTRAVENOUS
Refills: 0 | Status: DISCONTINUED | OUTPATIENT
Start: 2021-07-27 | End: 2021-07-28

## 2021-07-27 RX ADMIN — APIXABAN 5 MILLIGRAM(S): 2.5 TABLET, FILM COATED ORAL at 06:18

## 2021-07-27 RX ADMIN — APIXABAN 5 MILLIGRAM(S): 2.5 TABLET, FILM COATED ORAL at 17:25

## 2021-07-27 RX ADMIN — PANTOPRAZOLE SODIUM 40 MILLIGRAM(S): 20 TABLET, DELAYED RELEASE ORAL at 06:18

## 2021-07-27 RX ADMIN — Medication 25 MILLIGRAM(S): at 06:18

## 2021-07-27 RX ADMIN — SODIUM CHLORIDE 70 MILLILITER(S): 9 INJECTION, SOLUTION INTRAVENOUS at 16:01

## 2021-07-27 RX ADMIN — ATORVASTATIN CALCIUM 80 MILLIGRAM(S): 80 TABLET, FILM COATED ORAL at 21:32

## 2021-07-27 NOTE — PROGRESS NOTE ADULT - ASSESSMENT
58y Female with PMHx of HTN, brought to the hospital for AMS and confusion. Patient was last seen normal by her cousin 72h ago, she noticed facial asymmetry, but thought it is related to recent dental manipulation. She also noted that she is confused. She tried calling her the next day, and patient didn't answer so she got concerned as patient always pickup her phone. Patient was found altered, and confused. In ED stroke code called initially then was cancelled as symptoms have been there >48h. NIHSS 9. CTH left frontal subcortical infarct. CTA  head showed Mild fusiform dilation of the right V4 artery to 4 mm as it traverses the foramen magnum without evidence of rupture, and 1 mm outpouchings superior to this focal dilation which may represent small saccular aneurysms or the origin of small caliber vessels. CTA neck negative. CTP showing no perfusion defect. Patient to be admitted to [facility] for further workup.    Neuro  #CVA workup  #Acute left frontal lobe infarction  - Etiology likely embolic given her hx of afib, and non compliance to medication  - c/w Eliquis 5mg bid  - D/C DAPT  - called pharmacy for med rec - patient taking metoprolol and was taken Xaretlo (however, has not picked up since January) - attempted to call PCP to confirm if patient has afib, however, unable to reach.   - continue atorvastatin 80mg daily  - q4hr stroke neuro checks and vitals  - MRI showing acute left frontal lobe infarction   - A1C 6.3, TSH 0.802,   - Stroke Code HCT Results: No acute intracranial hemorrhage or demarcated transcortical infarction. Left frontal subcortical white matter infarct is favored to be chronic.  - Stroke Code CTA Results: Mild fusiform dilation of the right V4 artery to 4 mm as it traverses the foramen magnum without evidence of rupture. Left paracentral disc herniation at C6-7 resulting in at least moderate spinal canal stenosis.   - MRA head and neck to further evaluate CTA findings -->No aneurysm  - no need for MR cervical spine as patient does not have symptoms of cord compression  - TTE no PFO, mildly dilated LA,   - Stroke education    Cards  #HTN  - permissive hypertension, Goal -150  - TTE with bubble no PFO, good EF  - Stroke Code EKG Results: afib?    #HLD  - high dose statin as above in CVA  - LDL results: 155    #BRITT on CKD-Improving  -CPK noted to be downtrending  -Urine lytes  -IVF if EF is normal    #Heme  -Isolated Leukopenia  -Smear: some atypical lymphocytes, otherwise mature WBCs noted  mainly lymphopenia, and mild neutropenia  -due to acute nature suspect acute cause  -Check hep panel, HIV, parvovirus, CMV, EBV  -Check peripheral flow cytometry, AHMET and ESR  -Check B12, folate and copper level    Pulm  - call provider if SPO2 < 94%    GI  #Nutrition/Fluids/Electrolytes   - replete K<4 and Mg <2  - Diet: DASH  - IVf 70cc for 10h    Endocrine  #DM  - A1C results: 6.3  - TSH results: 0.802    DVT Prophylaxis  - heparin sq for DVT prophylaxis   - SCDs for DVT prophylaxis     Dispo: AR   Discussed daily hospital plans and goals with patient and family at bedside.   Discussed with Neurology Attending

## 2021-07-27 NOTE — PROGRESS NOTE ADULT - SUBJECTIVE AND OBJECTIVE BOX
Neurology Stroke Progress Note    INTERVAL HPI/OVERNIGHT EVENTS:  Patient seen and examined. No events over night. No events on telemetry. More cooperative today during neurological exam. Neurologically stable, with mild improvement noted in her comprehension/ word finding.      MEDICATIONS  (STANDING):  apixaban 5 milliGRAM(s) Oral every 12 hours  atorvastatin 80 milliGRAM(s) Oral at bedtime  metoprolol succinate ER 25 milliGRAM(s) Oral daily  pantoprazole    Tablet 40 milliGRAM(s) Oral before breakfast    MEDICATIONS  (PRN):      Allergies    No Known Allergies    Intolerances        Vital Signs Last 24 Hrs  T(C): 36.4 (27 Jul 2021 13:21), Max: 36.7 (27 Jul 2021 00:45)  T(F): 97.5 (27 Jul 2021 13:21), Max: 98.1 (27 Jul 2021 00:45)  HR: 74 (27 Jul 2021 12:16) (70 - 76)  BP: 154/71 (27 Jul 2021 12:16) (114/68 - 154/71)  BP(mean): 102 (27 Jul 2021 12:16) (86 - 108)  RR: 18 (27 Jul 2021 12:11) (16 - 18)  SpO2: 94% (27 Jul 2021 12:16) (94% - 100%)    Physical exam:  General: No acute distress, awake and alert  Eyes: Anicteric sclerae, moist conjunctivae, see below for CNs  Neck: trachea midline, FROM, supple, no thyromegaly or lymphadenopathy  Cardiovascular: Irregular rhythm normal rate  Pulmonary: Anterior breath sounds clear bilaterally, no crackles or wheezing. No use of accessory muscles  GI: Abdomen soft, non-distended, non-tender  Extremities: Radial and DP pulses +2, no edema    Neurologic:  Neurologic:  -Mental status: Awake, alert, oriented to person, hospital. significant expressive aphasia, but able to state age/ year. Follows command. Able to name simple objects such as purple, watch, key.   -Cranial nerves:   II: Visual fields are full to confrontation.  III, IV, VI: Extraocular movements are intact without nystagmus. Pupils equally round and reactive to light  V:  Facial sensation V1-V3 equal and intact   VII: right facial assymetry  Motor: Normal bulk and tone. RUE 4+/5, RLE 5-/5.  LUE 5/5, LLE 5/5.  Sensation: Intact to light touch bilaterally. Extinction to right on double simultaneous testing.      LABS:                        13.0   2.33  )-----------( 249      ( 27 Jul 2021 06:34 )             41.7     07-27    139  |  108  |  23  ----------------------------<  98  4.5   |  21<L>  |  1.38<H>    Ca    9.5      27 Jul 2021 06:34  Phos  3.4     07-26  Mg     1.9     07-26    TPro  7.0  /  Alb  3.7  /  TBili  0.4  /  DBili  x   /  AST  39  /  ALT  22  /  AlkPhos  64  07-27          RADIOLOGY & ADDITIONAL TESTS:    MR Angio Head No Cont (07.25.21 @ 14:51) >  MRI brain: Acute/subacute left frontal lobe infarction. No mass effect or midline shift. No evidence of hemorrhagic conversion.    MRA BRAIN: No large vessel occlusion. Mild stenosis of the distal right vertebral artery, mild spinal stenosis of the mid basilar artery and moderate stenosis of the right PCA as seen on recent CTA. No evidence of aneurysm of the proximal right vertebral artery correspondingto questionable outpouching on CTA which was therefore favored to represent atherosclerotic plaque.  MRA NECK: No significant stenosis of the cervical carotid or vertebral arteries.    < from: Echocardiogram w/ Bubble and Doppler (07.27.21 @ 10:09) >     1. Mild symmetric left ventricular hypertrophy.   2. Normal left and right ventricular size and systolic function.   3. Mildly dilated left atrium.   4. Injection of agitated saline via a peripheral vein reveals no evidence of a right-to-left shunt.   5. Mild mitral regurgitation.   6. No evidence of pulmonary hypertension, pulmonary artery systolic pressure is 19 mmHg.   7. No pericardial effusion.   8. No prior echo is available for comparison.

## 2021-07-27 NOTE — PROGRESS NOTE ADULT - SUBJECTIVE AND OBJECTIVE BOX
HOSPITALIST MEDICINE PROGRESS NOTE  Patient seen and examined at bedside. Resting in bed. Expressive aphasia remains.  No acute concerns or complaints.    When asked about any concerning sx of weight loss / fever/ chills/ night sweats - patient endorses she has lost "40 lbs" in "weeks" and decreased appetite. denies any other associated symptoms or changes other than weight.    PERTINENT REVIEW OF SYSTEMS:  Unable to reliably obtain    Allergies    No Known Allergies    Intolerances      MEDICATIONS  (STANDING):  apixaban 5 milliGRAM(s) Oral every 12 hours  atorvastatin 80 milliGRAM(s) Oral at bedtime  metoprolol succinate ER 25 milliGRAM(s) Oral daily  pantoprazole    Tablet 40 milliGRAM(s) Oral before breakfast    MEDICATIONS  (PRN):      Vital Signs Last 24 Hrs  T(C): 36.4 (27 Jul 2021 13:21), Max: 36.7 (27 Jul 2021 00:45)  T(F): 97.5 (27 Jul 2021 13:21), Max: 98.1 (27 Jul 2021 00:45)  HR: 70 (27 Jul 2021 12:11) (70 - 76)  BP: 154/71 (27 Jul 2021 12:11) (114/68 - 154/71)  BP(mean): 102 (27 Jul 2021 12:11) (86 - 108)  RR: 18 (27 Jul 2021 12:11) (16 - 18)  SpO2: 100% (27 Jul 2021 12:11) (96% - 100%)    PHYSICAL EXAM:  General: Well developed; well nourished; in no acute distress  Eyes: Anicteric sclerae, moist conjunctivae, PERRLA, EOMI  HENT: Moist mucous membranes; R sided droop  Neck: Trachea midline, supple  Lungs: Normal respiratory effort, no intercostal retractions  Cardiovascular: RRR  Abdomen: Soft, non-tender non-distended; Normal bowel sounds; No rebound or guarding  Extremities: 5/5 strength b/l   Neurological: R Facial asymmetry, expressive aphasia  Skin: Warm and dry. No obvious rash    LABS:                                   13.0   2.33  )-----------( 249      ( 27 Jul 2021 06:34 )             41.7   07-27    139  |  108  |  23  ----------------------------<  98  4.5   |  21<L>  |  1.38<H>    Ca    9.5      27 Jul 2021 06:34  Phos  3.4     07-26  Mg     1.9     07-26    TPro  7.0  /  Alb  3.7  /  TBili  0.4  /  DBili  x   /  AST  39  /  ALT  22  /  AlkPhos  64  07-27                    RADIOLOGY & ADDITIONAL STUDIES:    MRA BRAIN: No large vessel occlusion. Mild stenosis of the distal right vertebral artery, mild spinal stenosis of the mid basilar artery and moderate stenosis of the right PCA as seen on recent CTA. No evidence of aneurysm of the proximal right vertebral artery corresponding to questionable outpouching on CTA which was therefore favored to represent atherosclerotic plaque.    MRA:  IMPRESSION:    MRI brain: Acute/subacute left frontal lobe infarction. No mass effect or midline shift. No evidence of hemorrhagic conversion.

## 2021-07-27 NOTE — CONSULT NOTE ADULT - SUBJECTIVE AND OBJECTIVE BOX
HOSPITALIST CONSULT NOTE  HPI:  Last known well time/Time of onset of symptoms: 24-72 h  History obtained from cousin Brenda.    HPI: 58y Female with PMHx of HTN, brought to the hospital for AMS and confusion. Patient was last seen normal by her cousin 72h ago, she noticed facial asymmetry, but thought it is related to recent dental manipulation. She also noted that she is confused. She tried calling her the next day, and patient didn't answer so she got concerned as patient always pickup her phone. Patient was found altered, and confused. In ED stroke code called initially then was cancelled as symptoms have been there >48h. NIHSS 9. CTH left frontal subcortical infarct. CTA  head showed Mild fusiform dilation of the right V4 artery to 4 mm as it traverses the foramen magnum without evidence of rupture, and 1 mm outpouchings superior to this focal dilation which may represent small saccular aneurysms or the origin of small caliber vessels. CTA neck negative. CTP showing no perfusion defect. Patient to be admitted to [facility] for further workup.  mg once given.   (24 Jul 2021 00:29)    Allergies    No Known Allergies    Intolerances      Home Medications:    MEDICATIONS:  MEDICATIONS  (STANDING):  aspirin enteric coated 81 milliGRAM(s) Oral daily  atorvastatin 80 milliGRAM(s) Oral at bedtime  heparin   Injectable 5000 Unit(s) SubCutaneous every 8 hours  potassium chloride    Tablet ER 20 milliEquivalent(s) Oral every 2 hours    MEDICATIONS  (PRN):    PAST MEDICAL & SURGICAL HISTORY:    FAMILY HISTORY:    SOCIAL HISTORY:  Unable to obtain from patient    REVIEW OF SYSTEMS:  Unable to obtain due to expressive aphasia    Vital Signs Last 24 Hrs  T(C): 36.1 (24 Jul 2021 14:30), Max: 36.7 (24 Jul 2021 01:23)  T(F): 97 (24 Jul 2021 14:30), Max: 98.1 (24 Jul 2021 01:23)  HR: 110 (24 Jul 2021 09:53) (82 - 110)  BP: 118/72 (24 Jul 2021 09:53) (118/72 - 143/86)  BP(mean): 89 (24 Jul 2021 09:53) (89 - 107)  RR: 18 (24 Jul 2021 08:49) (15 - 18)  SpO2: 97% (24 Jul 2021 09:53) (96% - 99%)    07-23 @ 07:01  -  07-24 @ 07:00  --------------------------------------------------------  IN: 250 mL / OUT: 0 mL / NET: 250 mL        PHYSICAL EXAM:  General: Well developed; well nourished; in no acute distress  Eyes: Anicteric sclerae, moist conjunctivae, PERRLA, EOMI  HENT: Moist mucous membranes; R sided droop  Neck: Trachea midline, supple  Lungs: Normal respiratory effort, no intercostal retractions  Cardiovascular: RRR  Abdomen: Soft, non-tender non-distended; Normal bowel sounds; No rebound or guarding  Extremities: R sided facial droop  Neurological: Facial asymmetry, expressive aphasia  Skin: Warm and dry. No obvious rash    LABS:                        14.1   3.70  )-----------( 257      ( 24 Jul 2021 07:48 )             44.6     07-24    139  |  103  |  21  ----------------------------<  122<H>  3.0<L>   |  21<L>  |  1.35<H>    Ca    9.9      24 Jul 2021 07:48  Phos  3.4     07-24  Mg     2.2     07-24    TPro  7.4  /  Alb  4.0  /  TBili  1.1  /  DBili  x   /  AST  33  /  ALT  18  /  AlkPhos  68  07-24        PT/INR - ( 23 Jul 2021 22:34 )   PT: 13.4 sec;   INR: 1.12          PTT - ( 23 Jul 2021 22:34 )  PTT:28.8 sec    RADIOLOGY & ADDITIONAL STUDIES:     Reviewed
HEMATOLOGY/ONCOLOGY CONSULT NOTE  Last known well time/Time of onset of symptoms: 24-72 h  History obtained from cousin Brenda.    HPI: 58y Female with PMHx of HTN, brought to the hospital for AMS and confusion. Patient was last seen normal by her cousin 72h ago, she noticed facial asymmetry, but thought it is related to recent dental manipulation. She also noted that she is confused. She tried calling her the next day, and patient didn't answer so she got concerned as patient always pickup her phone. Patient was found altered, and confused. In ED stroke code called initially then was cancelled as symptoms have been there >48h. NIHSS 9. CTH left frontal subcortical infarct. CTA  head showed Mild fusiform dilation of the right V4 artery to 4 mm as it traverses the foramen magnum without evidence of rupture, and 1 mm outpouchings superior to this focal dilation which may represent small saccular aneurysms or the origin of small caliber vessels. CTA neck negative. CTP showing no perfusion defect. Patient to be admitted to [facility] for further workup.  mg once given.   (24 Jul 2021 00:29)        Allergies    No Known Allergies    Intolerances        MEDICATIONS  (STANDING):  apixaban 5 milliGRAM(s) Oral every 12 hours  atorvastatin 80 milliGRAM(s) Oral at bedtime  metoprolol succinate ER 25 milliGRAM(s) Oral daily  pantoprazole    Tablet 40 milliGRAM(s) Oral before breakfast    MEDICATIONS  (PRN):    Vital Signs Last 24 Hrs  T(C): 36.4 (07-27-21 @ 13:21), Max: 36.7 (07-27-21 @ 00:45)  T(F): 97.5 (07-27-21 @ 13:21), Max: 98.1 (07-27-21 @ 00:45)  HR: 70 (07-27-21 @ 12:11) (70 - 76)  BP: 154/71 (07-27-21 @ 12:11) (114/68 - 154/71)  BP(mean): 102 (07-27-21 @ 12:11) (86 - 108)  RR: 18 (07-27-21 @ 12:11) (16 - 18)  SpO2: 100% (07-27-21 @ 12:11) (96% - 100%)    PHYSICAL EXAM:  Constitutional: NAD, well-groomed, well-developed  HEENT: PERRLA, EOMI, Normal Hearing, MMM  Neck: No LAD, No JVD  Back: Normal spine flexure, No CVA tenderness  Respiratory: CTAB  Cardiovascular: S1 and S2, RRR, no M/G/R  Gastrointestinal: BS+, soft, NT/ND    CBC Full  -  ( 27 Jul 2021 06:34 )  WBC Count : 2.33 K/uL  RBC Count : 4.80 M/uL  Hemoglobin : 13.0 g/dL  Hematocrit : 41.7 %  Platelet Count - Automated : 249 K/uL  Mean Cell Volume : 86.9 fl  Mean Cell Hemoglobin : 27.1 pg  Mean Cell Hemoglobin Concentration : 31.2 gm/dL  Auto Neutrophil # : 1.60 K/uL  Auto Lymphocyte # : 0.28 K/uL  Auto Monocyte # : 0.28 K/uL  Auto Eosinophil # : 0.04 K/uL  Auto Basophil # : 0.02 K/uL  Auto Neutrophil % : 68.7 %  Auto Lymphocyte % : 12.2 %  Auto Monocyte % : 12.2 %  Auto Eosinophil % : 1.7 %  Auto Basophil % : 0.9 %            07-27    139  |  108  |  23  ----------------------------<  98  4.5   |  21<L>  |  1.38<H>    Ca    9.5      27 Jul 2021 06:34  Phos  3.4     07-26  Mg     1.9     07-26    TPro  7.0  /  Alb  3.7  /  TBili  0.4  /  DBili  x   /  AST  39  /  ALT  22  /  AlkPhos  64  07-27    Pathology:  
 **STROKE CODE CONSULT NOTE**    Last known well time/Time of onset of symptoms: 24-72 h  History obtained from cousin Brenda.    HPI: 58y Female with PMHx of HTN, brought to the hospital for AMS and confusion. Patient was last seen normal by her cousin 72h ago, she noticed facial asymmetry, but thought it is related to recent dental manipulation. She also noted that she is confused. She tried calling her the next day, and patient didn't answer so she got concerned as patient always pickup her phone. Patient was found altered, and confused. In ED stroke code called initially then was cancelled as symptoms have been there >48h. NIHSS 9. CTH left frontal subcortical infarct. CTA  head showed Mild fusiform dilation of the right V4 artery to 4 mm as it traverses the foramen magnum without evidence of rupture, and 1 mm outpouchings superior to this focal dilation which may represent small saccular aneurysms or the origin of small caliber vessels. CTA neck negative. CTP showing no perfusion defect. Patient to be admitted to [facility] for further workup.  mg once given.      T(C): 36.3 (07-23-21 @ 21:35), Max: 36.3 (07-23-21 @ 21:35)  HR: 90 (07-23-21 @ 21:35) (90 - 90)  BP: 138/94 (07-23-21 @ 21:35) (138/94 - 138/94)  RR: 16 (07-23-21 @ 21:35) (16 - 16)  SpO2: 99% (07-23-21 @ 21:35) (99% - 99%)    PAST MEDICAL & SURGICAL HISTORY:      FAMILY HISTORY:      SOCIAL HISTORY:   Patient lives alone   Smoking status: None  Drinking: unknown  Drug Use: unknown    ROS: ***  Constitutional: No fever, weight loss or fatigue  Eyes: No eye pain, visual disturbances, or discharge  ENMT:  No difficulty hearing, tinnitus; No sinus or throat pain  Neck: No pain or stiffness  Respiratory: No cough, wheezing, chills or hemoptysis  Cardiovascular: No chest pain, palpitations, shortness of breath, or leg swelling  Gastrointestinal: No abdominal pain. No nausea, vomiting or hematemesis; No diarrhea or constipation. Nohematochezia.  Genitourinary: No dysuria, frequency, hematuria or incontinence  Neurological: As per HPI  Skin: No itching, burning, rashes or lesions   Endocrine: No heat or cold intolerance; No hair loss  Musculoskeletal: No joint pain or swelling; No muscle, back or extremity pain  Heme/Lymph: No easy bruising or bleeding gums    MEDICATIONS  (STANDING):  atorvastatin 80 milliGRAM(s) Oral at bedtime  clopidogrel Tablet 75 milliGRAM(s) Oral Once  heparin   Injectable 5000 Unit(s) SubCutaneous every 8 hours    MEDICATIONS  (PRN):    Allergies    No Known Allergies    Intolerances      Vital Signs Last 24 Hrs  T(C): 36.3 (23 Jul 2021 21:35), Max: 36.3 (23 Jul 2021 21:35)  T(F): 97.4 (23 Jul 2021 21:35), Max: 97.4 (23 Jul 2021 21:35)  HR: 90 (23 Jul 2021 21:35) (90 - 90)  BP: 138/94 (23 Jul 2021 21:35) (138/94 - 138/94)  RR: 16 (23 Jul 2021 21:35) (16 - 16)  SpO2: 99% (23 Jul 2021 21:35) (99% - 99%)    Physical exam:  Constitutional: No acute distress, conversant    Neurologic:  -Mental status: Awake, alert, AOX2. Speech is slurred, dysarthria noted, severe expressive aphasia. Follows commands.    -Cranial nerves:   II: Visual fields are full to confrontation.  III, IV, VI: Extraocular movements are intact without nystagmus. Pupils equally round and reactive to light  V:  Facial sensation V1-V3 equal and intact   VII: Face is asymmetric with right sided facial droop  VIII: Hearing is bilaterally intact to finger rub  IX, X: Uvula is midline and soft palate rises symmetrically  XI: Head turning and shoulder shrug are intact.  XII: Tongue protrudes midline  Motor: Normal bulk and tone. No pronator drift. Strength bilateral upper extremity 5/5, bilateral lower extremities 5/5.  Sensation: Intact to light touch bilaterally. Neglect noted over the right on simultaneous examination   Coordination: No dysmetria on finger-to-nose and heel-to-shin bilaterally  Reflexes: Downgoing toes bilaterally   Gait: Deferred    NIHSS: 9 ASPECT Score:     Fingerstick Blood Glucose: CAPILLARY BLOOD GLUCOSE      POCT Blood Glucose.: 120 mg/dL (23 Jul 2021 21:31)    LABS:                        14.6   5.25  )-----------( 284      ( 23 Jul 2021 22:34 )             45.3     07-23    132<L>  |  99  |  26<H>  ----------------------------<  125<H>  See Note   |  21<L>  |  2.05<H>    Ca    9.8      23 Jul 2021 22:34    TPro  8.0  /  Alb  4.3  /  TBili  0.9  /  DBili  x   /  AST  See Note  /  ALT  See Note  /  AlkPhos  69  07-23    PT/INR - ( 23 Jul 2021 22:34 )   PT: 13.4 sec;   INR: 1.12          PTT - ( 23 Jul 2021 22:34 )  PTT:28.8 sec  CARDIAC MARKERS ( 23 Jul 2021 22:34 )  x     / 0.01 ng/mL / x     / x     / x              RADIOLOGY & ADDITIONAL STUDIES:     CT Angio Neck w/ IV Cont (07.23.21 @ 23:02) >  Mild fusiform dilation of the right V4 artery to 4 mm as it traverses the foramen magnum without evidence of rupture.  There are couple 1 mm outpouchings superior to this focal dilation which may represent small saccular aneurysms or the origin of small caliber vessels.    CT Angio Neck w/ IV Cont (07.23.21 @ 23:02) >  Unremarkable CTA examination of the neck.      CT Perfusion w/ Maps w/ IV Cont (07.23.21 @ 23:01) >  IMPRESSION:  Normal CT perfusion study.    CT Brain Stroke Protocol (07.23.21 @ 23:01) >  No acute intracranial hemorrhage or demarcated transcortical infarction.  Left frontal subcortical white matter infarct is favored to be subacute to chronic.              -----------------------------------------------------------------------------------------------------------------  IV-tPA (Y/N): No

## 2021-07-27 NOTE — CONSULT NOTE ADULT - ASSESSMENT
58y Female with PMHx of HTN, brought to the hospital for AMS and confusion found to have left frontal acute/subacute infarct.  Hematology consulted for work up of new onset leukopenia.    Leukopenia  Smear: some atypical lymphocytes, otherwise mature WBCs noted  mainly lymphopenia, and mild neutropenia  due to acute nature suspect acute cause  Check hep panel, HIV, parvovirus, CMV, EBV  Check peripheral flow cytometry, AHMET and ESR  Check B12, folate and copper level    D/w  (Hematology service attending).

## 2021-07-27 NOTE — PROGRESS NOTE ADULT - ASSESSMENT
58yF w/Hx of HTN presenting for AMS, confusion, and facial asymmetry found to have expressive aphasia and L frontal subcortical infarct, NIHSS of 9    #L frontal infarct likely embolic in nature- With expressive aphasia and facial asymmetry, NIHSS of 9 on presentation  - Management as per stroke service, allowing permissive HTN  - CTA w/mild fusiform dilation of the right V4 artery to 4 mm as it traverses the foramen magnum without evidence of rupture, and 1 mm outpouchings superior to this focal dilation which may represent small saccular aneurysms or the origin of small caliber vessels. CTA neck negative. CTP showing no perfusion defect.   - Med rec from pharmacy showing Rx for Metoprolol and Xarelto but had not been filled in past few months  - Eliquis per primary team  -Metoprolol continued per primary team     #leukopenia  -likely hemoconcentrated on admission and leukopenia is patient's baseline  -smudge cells on smear  -patient endorsed vague history of "40 lb" weight loss but does not know how quickly and does not endorse associated sx   -recommend HIV test   -heme onc consult per primary team for further management    #Elevated Cr-    -cr improving today; CK improving  -continue to encourage po intake; EF wnl can consider IV fluids   -avoid nephrotoxins  -strict I&O

## 2021-07-28 LAB
ALBUMIN SERPL ELPH-MCNC: 3.6 G/DL — SIGNIFICANT CHANGE UP (ref 3.3–5)
ALP SERPL-CCNC: 71 U/L — SIGNIFICANT CHANGE UP (ref 40–120)
ALT FLD-CCNC: 30 U/L — SIGNIFICANT CHANGE UP (ref 10–45)
ANION GAP SERPL CALC-SCNC: 11 MMOL/L — SIGNIFICANT CHANGE UP (ref 5–17)
AST SERPL-CCNC: 48 U/L — HIGH (ref 10–40)
BASOPHILS # BLD AUTO: 0.01 K/UL — SIGNIFICANT CHANGE UP (ref 0–0.2)
BASOPHILS NFR BLD AUTO: 0.4 % — SIGNIFICANT CHANGE UP (ref 0–2)
BILIRUB SERPL-MCNC: 0.3 MG/DL — SIGNIFICANT CHANGE UP (ref 0.2–1.2)
BUN SERPL-MCNC: 21 MG/DL — SIGNIFICANT CHANGE UP (ref 7–23)
CALCIUM SERPL-MCNC: 9.5 MG/DL — SIGNIFICANT CHANGE UP (ref 8.4–10.5)
CHLORIDE SERPL-SCNC: 106 MMOL/L — SIGNIFICANT CHANGE UP (ref 96–108)
CK SERPL-CCNC: 267 U/L — HIGH (ref 25–170)
CMV IGG FLD QL: >10 U/ML — HIGH
CMV IGG SERPL-IMP: POSITIVE
CMV IGM FLD-ACNC: <8 AU/ML — SIGNIFICANT CHANGE UP
CMV IGM SERPL QL: NEGATIVE — SIGNIFICANT CHANGE UP
CO2 SERPL-SCNC: 21 MMOL/L — LOW (ref 22–31)
CREAT SERPL-MCNC: 1.34 MG/DL — HIGH (ref 0.5–1.3)
EBV EA AB SER IA-ACNC: 89 U/ML — HIGH
EBV EA AB TITR SER IF: POSITIVE
EBV EA IGG SER-ACNC: POSITIVE
EBV NA IGG SER IA-ACNC: 215 U/ML — HIGH
EBV PATRN SPEC IB-IMP: SIGNIFICANT CHANGE UP
EBV VCA IGG AVIDITY SER QL IA: POSITIVE
EBV VCA IGM SER IA-ACNC: <10 U/ML — SIGNIFICANT CHANGE UP
EBV VCA IGM SER IA-ACNC: >750 U/ML — HIGH
EBV VCA IGM TITR FLD: NEGATIVE — SIGNIFICANT CHANGE UP
EOSINOPHIL # BLD AUTO: 0.06 K/UL — SIGNIFICANT CHANGE UP (ref 0–0.5)
EOSINOPHIL NFR BLD AUTO: 2.4 % — SIGNIFICANT CHANGE UP (ref 0–6)
ERYTHROCYTE [SEDIMENTATION RATE] IN BLOOD: 31 MM/HR — HIGH
FOLATE SERPL-MCNC: 18.1 NG/ML — SIGNIFICANT CHANGE UP
FOLATE SERPL-MCNC: >20 NG/ML — SIGNIFICANT CHANGE UP
GLUCOSE SERPL-MCNC: 106 MG/DL — HIGH (ref 70–99)
HCT VFR BLD CALC: 40 % — SIGNIFICANT CHANGE UP (ref 34.5–45)
HGB BLD-MCNC: 12.2 G/DL — SIGNIFICANT CHANGE UP (ref 11.5–15.5)
IMM GRANULOCYTES NFR BLD AUTO: 0.4 % — SIGNIFICANT CHANGE UP (ref 0–1.5)
LYMPHOCYTES # BLD AUTO: 0.62 K/UL — LOW (ref 1–3.3)
LYMPHOCYTES # BLD AUTO: 25 % — SIGNIFICANT CHANGE UP (ref 13–44)
MAGNESIUM SERPL-MCNC: 1.8 MG/DL — SIGNIFICANT CHANGE UP (ref 1.6–2.6)
MCHC RBC-ENTMCNC: 26.7 PG — LOW (ref 27–34)
MCHC RBC-ENTMCNC: 30.5 GM/DL — LOW (ref 32–36)
MCV RBC AUTO: 87.5 FL — SIGNIFICANT CHANGE UP (ref 80–100)
MONOCYTES # BLD AUTO: 0.24 K/UL — SIGNIFICANT CHANGE UP (ref 0–0.9)
MONOCYTES NFR BLD AUTO: 9.7 % — SIGNIFICANT CHANGE UP (ref 2–14)
NEUTROPHILS # BLD AUTO: 1.54 K/UL — LOW (ref 1.8–7.4)
NEUTROPHILS NFR BLD AUTO: 62.1 % — SIGNIFICANT CHANGE UP (ref 43–77)
NRBC # BLD: 0 /100 WBCS — SIGNIFICANT CHANGE UP (ref 0–0)
PHOSPHATE SERPL-MCNC: 3.9 MG/DL — SIGNIFICANT CHANGE UP (ref 2.5–4.5)
PLATELET # BLD AUTO: 247 K/UL — SIGNIFICANT CHANGE UP (ref 150–400)
POTASSIUM SERPL-MCNC: 4.6 MMOL/L — SIGNIFICANT CHANGE UP (ref 3.5–5.3)
POTASSIUM SERPL-SCNC: 4.6 MMOL/L — SIGNIFICANT CHANGE UP (ref 3.5–5.3)
PROT SERPL-MCNC: 6.8 G/DL — SIGNIFICANT CHANGE UP (ref 6–8.3)
RBC # BLD: 4.57 M/UL — SIGNIFICANT CHANGE UP (ref 3.8–5.2)
RBC # FLD: 15.1 % — HIGH (ref 10.3–14.5)
SODIUM SERPL-SCNC: 138 MMOL/L — SIGNIFICANT CHANGE UP (ref 135–145)
VIT B12 SERPL-MCNC: 536 PG/ML — SIGNIFICANT CHANGE UP (ref 232–1245)
VIT B12 SERPL-MCNC: 543 PG/ML — SIGNIFICANT CHANGE UP (ref 232–1245)
WBC # BLD: 2.48 K/UL — LOW (ref 3.8–10.5)
WBC # FLD AUTO: 2.48 K/UL — LOW (ref 3.8–10.5)

## 2021-07-28 PROCEDURE — 99233 SBSQ HOSP IP/OBS HIGH 50: CPT

## 2021-07-28 RX ORDER — LABETALOL HCL 100 MG
10 TABLET ORAL ONCE
Refills: 0 | Status: COMPLETED | OUTPATIENT
Start: 2021-07-28 | End: 2021-07-28

## 2021-07-28 RX ORDER — AMLODIPINE BESYLATE 2.5 MG/1
5 TABLET ORAL DAILY
Refills: 0 | Status: DISCONTINUED | OUTPATIENT
Start: 2021-07-28 | End: 2021-08-02

## 2021-07-28 RX ORDER — LABETALOL HCL 100 MG
10 TABLET ORAL ONCE
Refills: 0 | Status: DISCONTINUED | OUTPATIENT
Start: 2021-07-28 | End: 2021-07-28

## 2021-07-28 RX ADMIN — APIXABAN 5 MILLIGRAM(S): 2.5 TABLET, FILM COATED ORAL at 17:51

## 2021-07-28 RX ADMIN — APIXABAN 5 MILLIGRAM(S): 2.5 TABLET, FILM COATED ORAL at 05:44

## 2021-07-28 RX ADMIN — AMLODIPINE BESYLATE 5 MILLIGRAM(S): 2.5 TABLET ORAL at 10:19

## 2021-07-28 RX ADMIN — Medication 10 MILLIGRAM(S): at 01:25

## 2021-07-28 RX ADMIN — Medication 25 MILLIGRAM(S): at 05:44

## 2021-07-28 RX ADMIN — ATORVASTATIN CALCIUM 80 MILLIGRAM(S): 80 TABLET, FILM COATED ORAL at 21:16

## 2021-07-28 RX ADMIN — Medication 10 MILLIGRAM(S): at 02:35

## 2021-07-28 RX ADMIN — PANTOPRAZOLE SODIUM 40 MILLIGRAM(S): 20 TABLET, DELAYED RELEASE ORAL at 07:37

## 2021-07-28 NOTE — PROGRESS NOTE ADULT - ASSESSMENT
58y Female with PMHx of HTN, brought to the hospital for AMS and confusion. Patient was last seen normal by her cousin 72h ago, she noticed facial asymmetry, but thought it is related to recent dental manipulation. She also noted that she is confused. She tried calling her the next day, and patient didn't answer so she got concerned as patient always pickup her phone. Patient was found altered, and confused. In ED stroke code called initially then was cancelled as symptoms have been there >48h. NIHSS 9. CTH left frontal subcortical infarct. CTA  head showed Mild fusiform dilation of the right V4 artery to 4 mm as it traverses the foramen magnum without evidence of rupture, and 1 mm outpouchings superior to this focal dilation which may represent small saccular aneurysms or the origin of small caliber vessels. CTA neck negative. CTP showing no perfusion defect. Patient to be admitted to [facility] for further workup. MRI left anterior cortex stroke. TTE no PFO. Pending acute rehab.    Neuro  #CVA workup  #Acute left frontal lobe infarction  - Etiology likely embolic given her hx of afib, and non compliance to medication  - c/w Eliquis 5mg bid  - D/C DAPT  - continue atorvastatin 80mg daily  - q4hr stroke neuro checks and vitals   - A1C 6.3, TSH 0.802,   - Stroke Code HCT Results: No acute intracranial hemorrhage or demarcated transcortical infarction. Left frontal subcortical white matter infarct is favored to be chronic.  - Stroke Code CTA Results: Mild fusiform dilation of the right V4 artery to 4 mm as it traverses the foramen magnum without evidence of rupture. Left paracentral disc herniation at C6-7 resulting in at least moderate spinal canal stenosis.   - MRA head and neck to further evaluate CTA findings -->No aneurysm  - MRI showing acute left frontal lobe infarction  - TTE no PFO, mildly dilated LA,   - Stroke education    Cards  #HTN  - Goal -150  - Takes amlodipine 10 mg at home (non compliant)  - Re-start amlodipine 5 mg daily, increase as tolerated  - TTE with bubble no PFO, good EF  - Stroke Code EKG Results: afib?    #HLD  - high dose statin as above in CVA  - LDL results: 155    #Heme  -Isolated Leukopenia  -Smear: some atypical lymphocytes, otherwise mature WBCs noted  mainly lymphopenia, and mild neutropenia  -due to acute nature suspect acute cause  -HIV negative, HCV negative, ESR 31, vit b 12 543, folate 18  -Workup pending :Parvovirus, CMV, EBV, peripheral flow cytometry, AHMET , copper level    #Renal  #BRITT on CKD- Improving  -CPK noted to be downtrending  -Urine lytes  -s/p IVF    Pulm  - call provider if SPO2 < 94%    GI  #Nutrition/Fluids/Electrolytes   - replete K<4 and Mg <2  - Diet: DASH  - IVf 70cc for 10h-dc/ today    Endocrine  #DM  - A1C results: 6.3  - TSH results: 0.802    DVT Prophylaxis  - Eliquis  - SCDs for DVT prophylaxis     Dispo: AR   Discussed daily hospital plans and goals with patient and family at bedside.   Discussed with Neurology Attending 58y Female with PMHx of HTN, brought to the hospital for AMS and confusion. Patient was last seen normal by her cousin 72h ago, she noticed facial asymmetry, but thought it is related to recent dental manipulation. She also noted that she is confused. She tried calling her the next day, and patient didn't answer so she got concerned as patient always pickup her phone. Patient was found altered, and confused. In ED stroke code called initially then was cancelled as symptoms have been there >48h. NIHSS 9. CTH left frontal subcortical infarct. CTA  head showed Mild fusiform dilation of the right V4 artery to 4 mm as it traverses the foramen magnum without evidence of rupture, and 1 mm outpouchings superior to this focal dilation which may represent small saccular aneurysms or the origin of small caliber vessels. CTA neck negative. CTP showing no perfusion defect. Patient to be admitted to [facility] for further workup. MRI left anterior cortex stroke. TTE no PFO. Pending acute rehab.    Neuro  #CVA workup  #Acute left frontal lobe infarction  - Etiology likely embolic given her hx of afib, and non compliance to medication  - c/w Eliquis 5mg bid  - D/C DAPT  - continue atorvastatin 80mg daily  - q4hr stroke neuro checks and vitals   - A1C 6.3, TSH 0.802,   - Stroke Code HCT Results: No acute intracranial hemorrhage or demarcated transcortical infarction. Left frontal subcortical white matter infarct is favored to be chronic.  - Stroke Code CTA Results: Mild fusiform dilation of the right V4 artery to 4 mm as it traverses the foramen magnum without evidence of rupture. Left paracentral disc herniation at C6-7 resulting in at least moderate spinal canal stenosis.   - MRA head and neck to further evaluate CTA findings -->No aneurysm  - MRI showing acute left frontal lobe infarction  - TTE no PFO, mildly dilated LA,   - Stroke education    Cards  #HTN  - Goal -150  - Takes amlodipine 10 mg at home (non compliant)  - Re-start amlodipine 5 mg daily, increase as tolerated  - TTE with bubble no PFO, good EF  - Stroke Code EKG Results: afib?    #HLD  - high dose statin as above in CVA  - LDL results: 155    #Heme  -Isolated Leukopenia  -Smear: some atypical lymphocytes, otherwise mature WBCs noted  mainly lymphopenia, and mild neutropenia  -due to acute nature suspect acute cause  -HIV negative, HCV negative, ESR 31, vit b 12 543, folate 18  -Workup pending :Parvovirus, CMV, EBV, peripheral flow cytometry, AHMET , copper level    #Renal  #BRITT on CKD- Improving  -CPK noted to be downtrending  -Urine lytes  -s/p IVF    Pulm  - call provider if SPO2 < 94%    GI  #Nutrition/Fluids/Electrolytes   - replete K<4 and Mg <2  - Diet: DASH  - IVf 70cc for 10h-dc/ today    Endocrine  #DM  - A1C results: 6.3  - TSH results: 0.802    DVT Prophylaxis  - Eliquis  - SCDs for DVT prophylaxis     Dispo: AR / Downgrade to regular floor  Discussed daily hospital plans and goals with patient and family at bedside.   Discussed with Neurology Attending 58y Female with PMHx of HTN, brought to the hospital for AMS and confusion. Patient was last seen normal by her cousin 72h ago, she noticed facial asymmetry, but thought it is related to recent dental manipulation. She also noted that she is confused. She tried calling her the next day, and patient didn't answer so she got concerned as patient always pickup her phone. Patient was found altered, and confused. In ED stroke code called initially then was cancelled as symptoms have been there >48h. NIHSS 9. CTH left frontal subcortical infarct. CTA neck was negative for large vessel disease but CTA head showed multifocal areas of irregularity, primarily involving the posterior circulation.  MRI confirmed presence of a left frontal acute/subacute infarct.  MRA did not confirm aneurysm thought possibly seen on CTA head.  She was initially started on DAPT which was transitioned to Eliquis 5 mg BID given detection of a fib on monitor.  TTE was unremarkable apart from mildly enlarged LA; no PFO, normal EF.  She is also on high-intensity statin (). MRI C spine for possible moderate-severe spinal stenosis in outpatient setting given lack of myelopathic signs/symptoms on exam. Pending acute rehab. Course complicated by Unclear etiology of leukopenia. Heme/onc on board. Workup pending.    Neuro  #CVA workup  #Acute left frontal lobe infarction  - Etiology likely embolic given her hx of afib, and non compliance to medication  - c/w Eliquis 5mg bid  - continue atorvastatin 80mg daily  - q4hr stroke neuro checks and vitals   - A1C 6.3, TSH 0.802,   - Stroke Code HCT Results: No acute intracranial hemorrhage or demarcated transcortical infarction. Left frontal subcortical white matter infarct is favored to be chronic.  - Stroke Code CTA Results: Mild fusiform dilation of the right V4 artery to 4 mm as it traverses the foramen magnum without evidence of rupture. Left paracentral disc herniation at C6-7 resulting in at least moderate spinal canal stenosis.   - MRA head and neck to further evaluate CTA findings -->No aneurysm  - MRI showing acute left frontal lobe infarction  - TTE no PFO, mildly dilated LA,   - Stroke education    Cards  #HTN  - Goal -150  - Takes amlodipine 10 mg at home (non compliant)  - Re-start amlodipine 5 mg daily, increase as tolerated  - TTE with bubble no PFO, good EF  - Stroke Code EKG Results: afib?    #HLD  - high dose statin as above in CVA  - LDL results: 155    #Heme  -Isolated Leukopenia  -Smear: some atypical lymphocytes, otherwise mature WBCs noted  mainly lymphopenia, and mild neutropenia  -due to acute nature suspect acute cause  -HIV negative, HCV negative, ESR 31, vit b 12 543, folate 18  -Workup pending :Parvovirus, CMV, EBV, peripheral flow cytometry, AHMET , copper level    #Renal  #BRITT on CKD- Improving  -CPK noted to be downtrending  -Urine lytes  -s/p IVF    Pulm  - call provider if SPO2 < 94%    GI  #Nutrition/Fluids/Electrolytes   - replete K<4 and Mg <2  - Diet: DASH  - IVf 70cc for 10h-dc/ today    Endocrine  #DM  - A1C results: 6.3  - TSH results: 0.802    DVT Prophylaxis  - Eliquis  - SCDs for DVT prophylaxis     Dispo: AR / Downgrade to regular floor  Discussed daily hospital plans and goals with patient at bedside.   Discussed with Neurology Attending

## 2021-07-28 NOTE — CHART NOTE - NSCHARTNOTEFT_GEN_A_CORE
Spoke with son of the patient Mr. Johnathan Huizar [phone] regarding an update on his mothers condition. Care plan discussed and son was in agreement with the plan going forwards.

## 2021-07-28 NOTE — PROGRESS NOTE ADULT - ASSESSMENT
58yF w/Hx of HTN presenting for AMS, confusion, and facial asymmetry found to have expressive aphasia and L frontal subcortical infarct, NIHSS of 9    #L frontal infarct likely embolic in nature- With expressive aphasia and facial asymmetry, NIHSS of 9 on presentation  - Management as per stroke service, allowing permissive HTN  - CTA w/mild fusiform dilation of the right V4 artery to 4 mm as it traverses the foramen magnum without evidence of rupture, and 1 mm outpouchings superior to this focal dilation which may represent small saccular aneurysms or the origin of small caliber vessels. CTA neck negative. CTP showing no perfusion defect.   - Med rec from pharmacy showing Rx for Metoprolol and Xarelto but had not been filled in past few months  - Eliquis per primary team  -Metoprolol continued per primary team     #leukopenia  -likely hemoconcentrated on admission and leukopenia is patient's baseline  -smudge cells on smear  -patient endorsed vague history of "40 lb" weight loss but does not know how quickly and does not endorse associated sx   -recommend HIV test   -heme onc consult per primary team for further management    #Elevated Cr-    -cr improving today; CK improving  -continue to encourage po intake; EF wnl can consider IV fluids   -avoid nephrotoxins  -strict I&O 58yF w/Hx of HTN presenting for AMS, confusion, and facial asymmetry found to have expressive aphasia and L frontal subcortical infarct, NIHSS of 9    #L frontal infarct likely embolic in nature- With expressive aphasia and facial asymmetry, NIHSS of 9 on presentation  - Management as per stroke service, allowing permissive HTN  - CTA w/mild fusiform dilation of the right V4 artery to 4 mm as it traverses the foramen magnum without evidence of rupture, and 1 mm outpouchings superior to this focal dilation which may represent small saccular aneurysms or the origin of small caliber vessels. CTA neck negative. CTP showing no perfusion defect.   - Med rec from pharmacy showing Rx for Metoprolol and Xarelto but had not been filled in past few months  - Eliquis per primary team  -Metoprolol continued per primary team     #HTN  -continue home metoprolol  -norvasc 5mg added today; can transition to home norvasc 10mg if bp allows  -continue home doses upon discharge     #leukopenia  -likely hemoconcentrated on admission and leukopenia is patient's baseline  -smudge cells on smear  -patient endorsed vague history of "40 lb" weight loss but does not know how quickly and does not endorse associated sx   -heme onc consult per primary team for w/u of viral etiology and flow cytometry     #Elevated Cr-  likely 2/2 mild rhabdomyolysis on presentation  -cr improving today; CK improving  -continue to encourage po intake; EF wnl can consider IV fluids   -avoid nephrotoxins  -strict I&O

## 2021-07-28 NOTE — PROGRESS NOTE ADULT - SUBJECTIVE AND OBJECTIVE BOX
HOSPITALIST MEDICINE PROGRESS NOTE  Patient seen and examined at bedside. Resting in bed. Expressive aphasia remains.  No acute concerns or complaints.    When asked about any concerning sx of weight loss / fever/ chills/ night sweats - patient endorses she has lost "40 lbs" in "weeks" and decreased appetite. denies any other associated symptoms or changes other than weight.    PERTINENT REVIEW OF SYSTEMS:  Unable to reliably obtain    Allergies    No Known Allergies    Intolerances      MEDICATIONS  (STANDING):  apixaban 5 milliGRAM(s) Oral every 12 hours  atorvastatin 80 milliGRAM(s) Oral at bedtime  metoprolol succinate ER 25 milliGRAM(s) Oral daily  pantoprazole    Tablet 40 milliGRAM(s) Oral before breakfast    MEDICATIONS  (PRN):      Vital Signs Last 24 Hrs  T(C): 36.4 (27 Jul 2021 13:21), Max: 36.7 (27 Jul 2021 00:45)  T(F): 97.5 (27 Jul 2021 13:21), Max: 98.1 (27 Jul 2021 00:45)  HR: 70 (27 Jul 2021 12:11) (70 - 76)  BP: 154/71 (27 Jul 2021 12:11) (114/68 - 154/71)  BP(mean): 102 (27 Jul 2021 12:11) (86 - 108)  RR: 18 (27 Jul 2021 12:11) (16 - 18)  SpO2: 100% (27 Jul 2021 12:11) (96% - 100%)    PHYSICAL EXAM:  General: Well developed; well nourished; in no acute distress  Eyes: Anicteric sclerae, moist conjunctivae, PERRLA, EOMI  HENT: Moist mucous membranes; R sided droop  Neck: Trachea midline, supple  Lungs: Normal respiratory effort, no intercostal retractions  Cardiovascular: RRR  Abdomen: Soft, non-tender non-distended; Normal bowel sounds; No rebound or guarding  Extremities: 5/5 strength b/l   Neurological: R Facial asymmetry, expressive aphasia  Skin: Warm and dry. No obvious rash    LABS:                                   13.0   2.33  )-----------( 249      ( 27 Jul 2021 06:34 )             41.7   07-27    139  |  108  |  23  ----------------------------<  98  4.5   |  21<L>  |  1.38<H>    Ca    9.5      27 Jul 2021 06:34  Phos  3.4     07-26  Mg     1.9     07-26    TPro  7.0  /  Alb  3.7  /  TBili  0.4  /  DBili  x   /  AST  39  /  ALT  22  /  AlkPhos  64  07-27                    RADIOLOGY & ADDITIONAL STUDIES:    MRA BRAIN: No large vessel occlusion. Mild stenosis of the distal right vertebral artery, mild spinal stenosis of the mid basilar artery and moderate stenosis of the right PCA as seen on recent CTA. No evidence of aneurysm of the proximal right vertebral artery corresponding to questionable outpouching on CTA which was therefore favored to represent atherosclerotic plaque.    MRA:  IMPRESSION:    MRI brain: Acute/subacute left frontal lobe infarction. No mass effect or midline shift. No evidence of hemorrhagic conversion.   HOSPITALIST MEDICINE PROGRESS NOTE    Interval Notes:  Patient hypertensive overnight, requiring IV push - norvasc 5mg added this morning: of note is on home norvasc 10mg with metoprolol.      Patient seen and examined at bedside. Resting in bed. Expressive aphasia remains.  No acute concerns or complaints.        PERTINENT REVIEW OF SYSTEMS:  Unable to reliably obtain    Allergies    No Known Allergies    Intolerances      MEDICATIONS  (STANDING):  amLODIPine   Tablet 5 milliGRAM(s) Oral daily  apixaban 5 milliGRAM(s) Oral every 12 hours  atorvastatin 80 milliGRAM(s) Oral at bedtime  lactated ringers. 1000 milliLiter(s) (70 mL/Hr) IV Continuous <Continuous>  metoprolol succinate ER 25 milliGRAM(s) Oral daily  pantoprazole    Tablet 40 milliGRAM(s) Oral before breakfast    MEDICATIONS  (PRN):      Vital Signs Last 24 Hrs  T(C): 36.7 (28 Jul 2021 09:18), Max: 37 (28 Jul 2021 00:40)  T(F): 98 (28 Jul 2021 09:18), Max: 98.6 (28 Jul 2021 00:40)  HR: 72 (28 Jul 2021 08:55) (66 - 82)  BP: 122/70 (28 Jul 2021 08:55) (122/70 - 193/93)  BP(mean): 90 (28 Jul 2021 08:55) (90 - 134)  RR: 18 (28 Jul 2021 08:55) (15 - 18)  SpO2: 99% (28 Jul 2021 08:55) (98% - 100%)    PHYSICAL EXAM:  General: Well developed; well nourished; in no acute distress  Eyes: Anicteric sclerae, moist conjunctivae, PERRLA, EOMI  HENT: Moist mucous membranes; R sided droop  Neck: Trachea midline, supple  Lungs: Normal respiratory effort, no intercostal retractions  Cardiovascular: RRR  Abdomen: Soft, non-tender non-distended; Normal bowel sounds; No rebound or guarding  Extremities: 5/5 strength b/l   Neurological: R Facial asymmetry, expressive aphasia  Skin: Warm and dry. No obvious rash    LABS:                                              12.2   2.48  )-----------( 247      ( 28 Jul 2021 08:05 )             40.0   07-28    138  |  106  |  21  ----------------------------<  106<H>  4.6   |  21<L>  |  1.34<H>    Ca    9.5      28 Jul 2021 08:05  Phos  3.9     07-28  Mg     1.8     07-28    TPro  6.8  /  Alb  3.6  /  TBili  0.3  /  DBili  x   /  AST  48<H>  /  ALT  30  /  AlkPhos  71  07-28                  RADIOLOGY & ADDITIONAL STUDIES:    MRA BRAIN: No large vessel occlusion. Mild stenosis of the distal right vertebral artery, mild spinal stenosis of the mid basilar artery and moderate stenosis of the right PCA as seen on recent CTA. No evidence of aneurysm of the proximal right vertebral artery corresponding to questionable outpouching on CTA which was therefore favored to represent atherosclerotic plaque.    MRA:  IMPRESSION:    MRI brain: Acute/subacute left frontal lobe infarction. No mass effect or midline shift. No evidence of hemorrhagic conversion.

## 2021-07-28 NOTE — PROGRESS NOTE ADULT - SUBJECTIVE AND OBJECTIVE BOX
Acceptance Note from [facility], from [address] [place] [address] Course:  58y Female with PMHx of HTN w/ noncompliance, brought to the hospital for AMS and confusion. Patient was last seen normal by her cousin 72h ago, she noticed facial asymmetry, but thought it is related to recent dental manipulation. Patient was found altered, and confused. In ED stroke code called initially then was cancelled as symptoms have been there >48h. NIHSS 9. CTH left frontal subcortical infarct. CTA neck was negative for large vessel disease but CTA head showed multifocal areas of irregularity, primarily involving the posterior circulation.  MRI confirmed presence of a left frontal acute/subacute infarct.  She was initially started on DAPT which was transitioned to Eliquis 5 mg BID given detection of a fib on monitor.  TTE was unremarkable apart from mildly enlarged LA; no PFO, normal EF. She is also on high-intensity statin (). MRI C spine for possible moderate-severe spinal stenosis in outpatient setting given lack of myelopathic signs/symptoms on exam. Pending acute rehab. Course complicated by Unclear etiology of leukopenia. Heme/onc on board. Workup pending.      INTERVAL HPI/OVERNIGHT EVENTS:  Patient was seen and examined at bedside. As per nurse and patient, no o/n events, patient resting comfortably. No complaints at this time. Patient denies: fever, chills, dizziness, weakness, HA, Changes in vision, CP, palpitations, SOB, cough, N/V/D/C, dysuria, changes in bowel movements, LE edema. ROS otherwise negative.    VITAL SIGNS:  T(F): 97.7 (07-28-21 @ 17:49)  HR: 75 (07-28-21 @ 17:49)  BP: 122/71 (07-28-21 @ 17:49)  RR: 17 (07-28-21 @ 17:49)  SpO2: 100% (07-28-21 @ 17:49)  Wt(kg): --    PHYSICAL EXAM:    General: No acute distress, awake and alert  Eyes: Anicteric sclerae, moist conjunctivae, see below for CNs  Neck: trachea midline, FROM, supple, no thyromegaly or lymphadenopathy  Cardiovascular: Irregular rhythm normal rate  Pulmonary: Anterior breath sounds clear bilaterally, no crackles or wheezing. No use of accessory muscles  GI: Abdomen soft, non-distended, non-tender  Extremities: Radial and DP pulses +2, no edema    Neurologic:  Neurologic:  -Mental status: Awake, alert, oriented to person, hospital. significant expressive aphasia, but able to state age/ year. Follows command. Able to name simple objects such as purple, watch, key.   -Cranial nerves:   II: Visual fields are full to confrontation.  III, IV, VI: Extraocular movements are intact without nystagmus. Pupils equally round and reactive to light  V:  Facial sensation V1-V3 equal and intact   VII: right facial asymmetry  Motor: Normal bulk and tone. RUE 4+/5, RLE 5-/5.  LUE 5/5, LLE 5/5.  Sensation: Intact to light touch bilaterally. Extinction to right on double simultaneous testing.  Gait: Normal       MEDICATIONS  (STANDING):  amLODIPine   Tablet 5 milliGRAM(s) Oral daily  apixaban 5 milliGRAM(s) Oral every 12 hours  atorvastatin 80 milliGRAM(s) Oral at bedtime  metoprolol succinate ER 25 milliGRAM(s) Oral daily  pantoprazole    Tablet 40 milliGRAM(s) Oral before breakfast    MEDICATIONS  (PRN):      Allergies    No Known Allergies    Intolerances        LABS:                        12.2   2.48  )-----------( 247      ( 28 Jul 2021 08:05 )             40.0     07-28    138  |  106  |  21  ----------------------------<  106<H>  4.6   |  21<L>  |  1.34<H>    Ca    9.5      28 Jul 2021 08:05  Phos  3.9     07-28  Mg     1.8     07-28    TPro  6.8  /  Alb  3.6  /  TBili  0.3  /  DBili  x   /  AST  48<H>  /  ALT  30  /  AlkPhos  71  07-28          RADIOLOGY & ADDITIONAL TESTS:  Reviewed

## 2021-07-28 NOTE — PROGRESS NOTE ADULT - SUBJECTIVE AND OBJECTIVE BOX
Neurology Stroke Progress Note    INTERVAL HPI/OVERNIGHT EVENTS:  Patient seen and examined. No events on telemetry. Had 2 episodes of HTN s/p IV labetalol twice. BP better controlled now. Patient's  Neurologically stable, with improvement noted in her comprehension/ word finding/ aphasia    MEDICATIONS  (STANDING):  amLODIPine   Tablet 5 milliGRAM(s) Oral daily  apixaban 5 milliGRAM(s) Oral every 12 hours  atorvastatin 80 milliGRAM(s) Oral at bedtime  lactated ringers. 1000 milliLiter(s) (70 mL/Hr) IV Continuous <Continuous>  metoprolol succinate ER 25 milliGRAM(s) Oral daily  pantoprazole    Tablet 40 milliGRAM(s) Oral before breakfast    MEDICATIONS  (PRN):      Allergies    No Known Allergies    Intolerances        Vital Signs Last 24 Hrs  T(C): 36.3 (28 Jul 2021 12:33), Max: 37 (28 Jul 2021 00:40)  T(F): 97.4 (28 Jul 2021 12:33), Max: 98.6 (28 Jul 2021 00:40)  HR: 72 (28 Jul 2021 12:48) (66 - 82)  BP: 116/62 (28 Jul 2021 12:48) (116/62 - 193/93)  BP(mean): 83 (28 Jul 2021 12:48) (83 - 134)  RR: 18 (28 Jul 2021 12:48) (15 - 18)  SpO2: 96% (28 Jul 2021 12:48) (96% - 100%)    Physical exam:  General: No acute distress, awake and alert  Eyes: Anicteric sclerae, moist conjunctivae, see below for CNs  Neck: trachea midline, FROM, supple, no thyromegaly or lymphadenopathy  Cardiovascular: Irregular rhythm normal rate  Pulmonary: Anterior breath sounds clear bilaterally, no crackles or wheezing. No use of accessory muscles  GI: Abdomen soft, non-distended, non-tender  Extremities: Radial and DP pulses +2, no edema    Neurologic:  Neurologic:  -Mental status: Awake, alert, oriented to person, hospital. significant expressive aphasia, but able to state age/ year. Follows command. Able to name simple objects such as purple, watch, key.   -Cranial nerves:   II: Visual fields are full to confrontation.  III, IV, VI: Extraocular movements are intact without nystagmus. Pupils equally round and reactive to light  V:  Facial sensation V1-V3 equal and intact   VII: right facial asymmetry  Motor: Normal bulk and tone. RUE 4+/5, RLE 5-/5.  LUE 5/5, LLE 5/5.  Sensation: Intact to light touch bilaterally. Extinction to right on double simultaneous testing.  Gait: Normal       LABS:                        12.2   2.48  )-----------( 247      ( 28 Jul 2021 08:05 )             40.0     07-28    138  |  106  |  21  ----------------------------<  106<H>  4.6   |  21<L>  |  1.34<H>    Ca    9.5      28 Jul 2021 08:05  Phos  3.9     07-28  Mg     1.8     07-28    TPro  6.8  /  Alb  3.6  /  TBili  0.3  /  DBili  x   /  AST  48<H>  /  ALT  30  /  AlkPhos  71  07-28          RADIOLOGY & ADDITIONAL TESTS:  MR Angio Head No Cont (07.25.21 @ 14:51) >  MRI brain: Acute/subacute left frontal lobe infarction. No mass effect or midline shift. No evidence of hemorrhagic conversion.    MRA BRAIN: No large vessel occlusion. Mild stenosis of the distal right vertebral artery, mild spinal stenosis of the mid basilar artery and moderate stenosis of the right PCA as seen on recent CTA. No evidence of aneurysm of the proximal right vertebral artery correspondingto questionable outpouching on CTA which was therefore favored to represent atherosclerotic plaque.  MRA NECK: No significant stenosis of the cervical carotid or vertebral arteries.    < from: Echocardiogram w/ Bubble and Doppler (07.27.21 @ 10:09) >     1. Mild symmetric left ventricular hypertrophy.   2. Normal left and right ventricular size and systolic function.   3. Mildly dilated left atrium.   4. Injection of agitated saline via a peripheral vein reveals no evidence of a right-to-left shunt.   5. Mild mitral regurgitation.   6. No evidence of pulmonary hypertension, pulmonary artery systolic pressure is 19 mmHg.   7. No pericardial effusion.   8. No prior echo is available for comparison.   Neurology Stroke Transfer Note:    Hospital course:  58y Female with PMHx of HTN w/ noncompliance, brought to the hospital for AMS and confusion. Patient was last seen normal by her cousin 72h ago, she noticed facial asymmetry, but thought it is related to recent dental manipulation. Patient was found altered, and confused. In ED stroke code called initially then was cancelled as symptoms have been there >48h. NIHSS 9. CTH left frontal subcortical infarct. CTA neck was negative for large vessel disease but CTA head showed multifocal areas of irregularity, primarily involving the posterior circulation.  MRI confirmed presence of a left frontal acute/subacute infarct.  She was initially started on DAPT which was transitioned to Eliquis 5 mg BID given detection of a fib on monitor.  TTE was unremarkable apart from mildly enlarged LA; no PFO, normal EF. She is also on high-intensity statin (). MRI C spine for possible moderate-severe spinal stenosis in outpatient setting given lack of myelopathic signs/symptoms on exam. Pending acute rehab. Course complicated by Unclear etiology of leukopenia. Heme/onc on board. Workup pending.    INTERVAL HPI/OVERNIGHT EVENTS:  Patient seen and examined. No events on telemetry. Had 2 episodes of HTN s/p IV labetalol twice. BP better controlled now. Patient's  Neurologically stable, with improvement noted in her comprehension/ word finding/ aphasia    MEDICATIONS  (STANDING):  amLODIPine   Tablet 5 milliGRAM(s) Oral daily  apixaban 5 milliGRAM(s) Oral every 12 hours  atorvastatin 80 milliGRAM(s) Oral at bedtime  lactated ringers. 1000 milliLiter(s) (70 mL/Hr) IV Continuous <Continuous>  metoprolol succinate ER 25 milliGRAM(s) Oral daily  pantoprazole    Tablet 40 milliGRAM(s) Oral before breakfast    MEDICATIONS  (PRN):      Allergies    No Known Allergies    Intolerances        Vital Signs Last 24 Hrs  T(C): 36.3 (28 Jul 2021 12:33), Max: 37 (28 Jul 2021 00:40)  T(F): 97.4 (28 Jul 2021 12:33), Max: 98.6 (28 Jul 2021 00:40)  HR: 72 (28 Jul 2021 12:48) (66 - 82)  BP: 116/62 (28 Jul 2021 12:48) (116/62 - 193/93)  BP(mean): 83 (28 Jul 2021 12:48) (83 - 134)  RR: 18 (28 Jul 2021 12:48) (15 - 18)  SpO2: 96% (28 Jul 2021 12:48) (96% - 100%)    Physical exam:  General: No acute distress, awake and alert  Eyes: Anicteric sclerae, moist conjunctivae, see below for CNs  Neck: trachea midline, FROM, supple, no thyromegaly or lymphadenopathy  Cardiovascular: Irregular rhythm normal rate  Pulmonary: Anterior breath sounds clear bilaterally, no crackles or wheezing. No use of accessory muscles  GI: Abdomen soft, non-distended, non-tender  Extremities: Radial and DP pulses +2, no edema    Neurologic:  Neurologic:  -Mental status: Awake, alert, oriented to person, hospital. significant expressive aphasia, but able to state age/ year. Follows command. Able to name simple objects such as purple, watch, key.   -Cranial nerves:   II: Visual fields are full to confrontation.  III, IV, VI: Extraocular movements are intact without nystagmus. Pupils equally round and reactive to light  V:  Facial sensation V1-V3 equal and intact   VII: right facial asymmetry  Motor: Normal bulk and tone. RUE 4+/5, RLE 5-/5.  LUE 5/5, LLE 5/5.  Sensation: Intact to light touch bilaterally. Extinction to right on double simultaneous testing.  Gait: Normal       LABS:                        12.2   2.48  )-----------( 247      ( 28 Jul 2021 08:05 )             40.0     07-28    138  |  106  |  21  ----------------------------<  106<H>  4.6   |  21<L>  |  1.34<H>    Ca    9.5      28 Jul 2021 08:05  Phos  3.9     07-28  Mg     1.8     07-28    TPro  6.8  /  Alb  3.6  /  TBili  0.3  /  DBili  x   /  AST  48<H>  /  ALT  30  /  AlkPhos  71  07-28          RADIOLOGY & ADDITIONAL TESTS:  MR Angio Head No Cont (07.25.21 @ 14:51) >  MRI brain: Acute/subacute left frontal lobe infarction. No mass effect or midline shift. No evidence of hemorrhagic conversion.    MRA BRAIN: No large vessel occlusion. Mild stenosis of the distal right vertebral artery, mild spinal stenosis of the mid basilar artery and moderate stenosis of the right PCA as seen on recent CTA. No evidence of aneurysm of the proximal right vertebral artery correspondingto questionable outpouching on CTA which was therefore favored to represent atherosclerotic plaque.  MRA NECK: No significant stenosis of the cervical carotid or vertebral arteries.    < from: Echocardiogram w/ Bubble and Doppler (07.27.21 @ 10:09) >     1. Mild symmetric left ventricular hypertrophy.   2. Normal left and right ventricular size and systolic function.   3. Mildly dilated left atrium.   4. Injection of agitated saline via a peripheral vein reveals no evidence of a right-to-left shunt.   5. Mild mitral regurgitation.   6. No evidence of pulmonary hypertension, pulmonary artery systolic pressure is 19 mmHg.   7. No pericardial effusion.   8. No prior echo is available for comparison.

## 2021-07-28 NOTE — PROGRESS NOTE ADULT - ASSESSMENT
58y Female with PMHx of HTN, brought to the hospital for AMS and confusion, found to have L frontal infarct, hospital course complicated by leukopenia    Neuro  #CVA workup  #Acute left frontal lobe infarction  - Etiology likely embolic given her hx of afib, and non compliance to medication  - c/w Eliquis 5mg bid  - continue atorvastatin 80mg daily  - q4hr stroke neuro checks and vitals   - A1C 6.3, TSH 0.802,   - Stroke Code HCT Results: No acute intracranial hemorrhage or demarcated transcortical infarction. Left frontal subcortical white matter infarct is favored to be chronic.  - Stroke Code CTA Results: Mild fusiform dilation of the right V4 artery to 4 mm as it traverses the foramen magnum without evidence of rupture. Left paracentral disc herniation at C6-7 resulting in at least moderate spinal canal stenosis.   - MRA head and neck to further evaluate CTA findings -->No aneurysm  - MRI showing acute left frontal lobe infarction  - TTE no PFO, mildly dilated LA,   - Stroke education    Cards  #HTN  - Goal -150  - Takes amlodipine 10 mg at home (non compliant)  - Re-start amlodipine 5 mg daily, increase as tolerated  - TTE with bubble no PFO, good EF  - Stroke Code EKG Results: afib    #HLD  - high dose statin as above in CVA  - LDL results: 155    #Heme  -Isolated Leukopenia  -Smear: some atypical lymphocytes, otherwise mature WBCs noted  mainly lymphopenia, and mild neutropenia  -due to acute nature suspect acute cause  -HIV negative, HCV negative, ESR 31, vit b 12 543, folate 18  -Workup pending :Parvovirus, CMV, EBV, peripheral flow cytometry, AHMET , copper level    #Renal  #BRITT on CKD- Improving  -CPK noted to be downtrending  -Urine lytes  -s/p IVF    Pulm  - call provider if SPO2 < 94%    GI  #Nutrition/Fluids/Electrolytes   - replete K<4 and Mg <2  - Diet: DASH  - IVf 70cc for 10h-dc/ today    Endocrine  #DM  - A1C results: 6.3  - TSH results: 0.802    DVT Prophylaxis  - Eliquis  - SCDs for DVT prophylaxis     Dispo: AR / Downgrade to regular floor pending rehab placement  Discussed daily hospital plans and goals with patient at bedside.   Discussed with Neurology Attending

## 2021-07-29 ENCOUNTER — TRANSCRIPTION ENCOUNTER (OUTPATIENT)
Age: 66
End: 2021-07-29

## 2021-07-29 LAB
ALBUMIN SERPL ELPH-MCNC: 2.8 G/DL — LOW (ref 3.3–5)
ALBUMIN SERPL ELPH-MCNC: 3.6 G/DL — SIGNIFICANT CHANGE UP (ref 3.3–5)
ALP SERPL-CCNC: 69 U/L — SIGNIFICANT CHANGE UP (ref 40–120)
ALP SERPL-CCNC: 72 U/L — SIGNIFICANT CHANGE UP (ref 40–120)
ALT FLD-CCNC: 28 U/L — SIGNIFICANT CHANGE UP (ref 10–45)
ALT FLD-CCNC: SIGNIFICANT CHANGE UP U/L (ref 10–45)
ANION GAP SERPL CALC-SCNC: 12 MMOL/L — SIGNIFICANT CHANGE UP (ref 5–17)
ANION GAP SERPL CALC-SCNC: 8 MMOL/L — SIGNIFICANT CHANGE UP (ref 5–17)
AST SERPL-CCNC: 35 U/L — SIGNIFICANT CHANGE UP (ref 10–40)
AST SERPL-CCNC: SIGNIFICANT CHANGE UP U/L (ref 10–40)
BASOPHILS # BLD AUTO: 0.01 K/UL — SIGNIFICANT CHANGE UP (ref 0–0.2)
BASOPHILS NFR BLD AUTO: 0.4 % — SIGNIFICANT CHANGE UP (ref 0–2)
BILIRUB SERPL-MCNC: 0.3 MG/DL — SIGNIFICANT CHANGE UP (ref 0.2–1.2)
BILIRUB SERPL-MCNC: 0.3 MG/DL — SIGNIFICANT CHANGE UP (ref 0.2–1.2)
BUN SERPL-MCNC: 16 MG/DL — SIGNIFICANT CHANGE UP (ref 7–23)
BUN SERPL-MCNC: 17 MG/DL — SIGNIFICANT CHANGE UP (ref 7–23)
CALCIUM SERPL-MCNC: 9.6 MG/DL — SIGNIFICANT CHANGE UP (ref 8.4–10.5)
CALCIUM SERPL-MCNC: 9.6 MG/DL — SIGNIFICANT CHANGE UP (ref 8.4–10.5)
CHLORIDE SERPL-SCNC: 109 MMOL/L — HIGH (ref 96–108)
CHLORIDE SERPL-SCNC: 109 MMOL/L — HIGH (ref 96–108)
CO2 SERPL-SCNC: 13 MMOL/L — LOW (ref 22–31)
CO2 SERPL-SCNC: 24 MMOL/L — SIGNIFICANT CHANGE UP (ref 22–31)
CREAT SERPL-MCNC: 1.01 MG/DL — SIGNIFICANT CHANGE UP (ref 0.5–1.3)
CREAT SERPL-MCNC: 1.21 MG/DL — SIGNIFICANT CHANGE UP (ref 0.5–1.3)
EOSINOPHIL # BLD AUTO: 0.07 K/UL — SIGNIFICANT CHANGE UP (ref 0–0.5)
EOSINOPHIL NFR BLD AUTO: 3.1 % — SIGNIFICANT CHANGE UP (ref 0–6)
GLUCOSE BLDC GLUCOMTR-MCNC: 155 MG/DL — HIGH (ref 70–99)
GLUCOSE SERPL-MCNC: 129 MG/DL — HIGH (ref 70–99)
GLUCOSE SERPL-MCNC: 99 MG/DL — SIGNIFICANT CHANGE UP (ref 70–99)
HCT VFR BLD CALC: 42.9 % — SIGNIFICANT CHANGE UP (ref 34.5–45)
HGB BLD-MCNC: 12.5 G/DL — SIGNIFICANT CHANGE UP (ref 11.5–15.5)
IMM GRANULOCYTES NFR BLD AUTO: 0 % — SIGNIFICANT CHANGE UP (ref 0–1.5)
LYMPHOCYTES # BLD AUTO: 0.57 K/UL — LOW (ref 1–3.3)
LYMPHOCYTES # BLD AUTO: 24.9 % — SIGNIFICANT CHANGE UP (ref 13–44)
MCHC RBC-ENTMCNC: 27.4 PG — SIGNIFICANT CHANGE UP (ref 27–34)
MCHC RBC-ENTMCNC: 29.1 GM/DL — LOW (ref 32–36)
MCV RBC AUTO: 93.9 FL — SIGNIFICANT CHANGE UP (ref 80–100)
MONOCYTES # BLD AUTO: 0.29 K/UL — SIGNIFICANT CHANGE UP (ref 0–0.9)
MONOCYTES NFR BLD AUTO: 12.7 % — SIGNIFICANT CHANGE UP (ref 2–14)
NEUTROPHILS # BLD AUTO: 1.35 K/UL — LOW (ref 1.8–7.4)
NEUTROPHILS NFR BLD AUTO: 58.9 % — SIGNIFICANT CHANGE UP (ref 43–77)
NRBC # BLD: 0 /100 WBCS — SIGNIFICANT CHANGE UP (ref 0–0)
PLATELET # BLD AUTO: 194 K/UL — SIGNIFICANT CHANGE UP (ref 150–400)
POTASSIUM SERPL-MCNC: 4.8 MMOL/L — SIGNIFICANT CHANGE UP (ref 3.5–5.3)
POTASSIUM SERPL-MCNC: SIGNIFICANT CHANGE UP MMOL/L (ref 3.5–5.3)
POTASSIUM SERPL-SCNC: 4.8 MMOL/L — SIGNIFICANT CHANGE UP (ref 3.5–5.3)
POTASSIUM SERPL-SCNC: SIGNIFICANT CHANGE UP MMOL/L (ref 3.5–5.3)
PROT SERPL-MCNC: 6.8 G/DL — SIGNIFICANT CHANGE UP (ref 6–8.3)
PROT SERPL-MCNC: 6.9 G/DL — SIGNIFICANT CHANGE UP (ref 6–8.3)
RBC # BLD: 4.57 M/UL — SIGNIFICANT CHANGE UP (ref 3.8–5.2)
RBC # FLD: 15.3 % — HIGH (ref 10.3–14.5)
SODIUM SERPL-SCNC: 134 MMOL/L — LOW (ref 135–145)
SODIUM SERPL-SCNC: 141 MMOL/L — SIGNIFICANT CHANGE UP (ref 135–145)
WBC # BLD: 2.29 K/UL — LOW (ref 3.8–10.5)
WBC # FLD AUTO: 2.29 K/UL — LOW (ref 3.8–10.5)

## 2021-07-29 PROCEDURE — 99233 SBSQ HOSP IP/OBS HIGH 50: CPT

## 2021-07-29 PROCEDURE — 99221 1ST HOSP IP/OBS SF/LOW 40: CPT

## 2021-07-29 RX ORDER — APIXABAN 2.5 MG/1
1 TABLET, FILM COATED ORAL
Qty: 60 | Refills: 0
Start: 2021-07-29 | End: 2021-08-27

## 2021-07-29 RX ORDER — AMLODIPINE BESYLATE 2.5 MG/1
1 TABLET ORAL
Qty: 30 | Refills: 0
Start: 2021-07-29 | End: 2021-08-27

## 2021-07-29 RX ORDER — METOPROLOL TARTRATE 50 MG
1 TABLET ORAL
Qty: 30 | Refills: 0
Start: 2021-07-29 | End: 2021-08-27

## 2021-07-29 RX ORDER — ATORVASTATIN CALCIUM 80 MG/1
1 TABLET, FILM COATED ORAL
Qty: 30 | Refills: 0
Start: 2021-07-29 | End: 2021-08-27

## 2021-07-29 RX ORDER — OMEPRAZOLE 10 MG/1
1 CAPSULE, DELAYED RELEASE ORAL
Qty: 30 | Refills: 0
Start: 2021-07-29 | End: 2021-08-27

## 2021-07-29 RX ADMIN — APIXABAN 5 MILLIGRAM(S): 2.5 TABLET, FILM COATED ORAL at 17:59

## 2021-07-29 RX ADMIN — AMLODIPINE BESYLATE 5 MILLIGRAM(S): 2.5 TABLET ORAL at 05:53

## 2021-07-29 RX ADMIN — PANTOPRAZOLE SODIUM 40 MILLIGRAM(S): 20 TABLET, DELAYED RELEASE ORAL at 05:53

## 2021-07-29 RX ADMIN — ATORVASTATIN CALCIUM 80 MILLIGRAM(S): 80 TABLET, FILM COATED ORAL at 22:15

## 2021-07-29 RX ADMIN — APIXABAN 5 MILLIGRAM(S): 2.5 TABLET, FILM COATED ORAL at 05:52

## 2021-07-29 RX ADMIN — Medication 25 MILLIGRAM(S): at 05:53

## 2021-07-29 NOTE — PROGRESS NOTE ADULT - ASSESSMENT
58yF w/Hx of HTN presenting for AMS, confusion, and facial asymmetry found to have expressive aphasia and L frontal subcortical infarct, NIHSS of 9    #L frontal infarct likely embolic in nature- With expressive aphasia and facial asymmetry, NIHSS of 9 on presentation  - Management as per stroke service, allowing permissive HTN  - CTA w/mild fusiform dilation of the right V4 artery to 4 mm as it traverses the foramen magnum without evidence of rupture, and 1 mm outpouchings superior to this focal dilation which may represent small saccular aneurysms or the origin of small caliber vessels. CTA neck negative. CTP showing no perfusion defect.   - Med rec from pharmacy showing Rx for Metoprolol and Xarelto but had not been filled in past few months  - Eliquis per primary team  -Metoprolol continued per primary team     #HTN  -continue home metoprolol  -norvasc 5mg added today - would continue norvasc 5mg upon discharge as patient's bp well controlled overnight (was on 10mg at home but 5mg seems appropriate)     #leukopenia  -likely hemoconcentrated on admission and leukopenia is patient's baseline  -smudge cells on smear  -patient endorsed vague history of "40 lb" weight loss but does not know how quickly and does not endorse associated sx   -heme onc consult per primary team for w/u of viral etiology and flow cytometry - f/u recommendations    #Elevated Cr-  likely 2/2 mild rhabdomyolysis on presentation  -resolved  -continue to encourage po intake   -avoid nephrotoxins  -strict I&O

## 2021-07-29 NOTE — PROGRESS NOTE ADULT - SUBJECTIVE AND OBJECTIVE BOX
INTERVAL HPI/OVERNIGHT EVENTS:  Patient was seen and examined at bedside. As per nurse and patient, no o/n events, patient resting comfortably. No complaints at this time. Patient denies: fever, chills, dizziness, weakness, HA, Changes in vision, CP, palpitations, SOB, cough, N/V/D/C, dysuria, changes in bowel movements, LE edema. ROS otherwise negative.    Pending discharge to [facility]    VITAL SIGNS:  T(F): 97.8 (07-29-21 @ 12:45)  HR: 70 (07-29-21 @ 12:45)  BP: 123/75 (07-29-21 @ 12:45)  RR: 17 (07-29-21 @ 12:45)  SpO2: 98% (07-29-21 @ 12:45)  Wt(kg): --    PHYSICAL EXAM:    General: No acute distress, awake and alert  Eyes: Anicteric sclerae, moist conjunctivae, see below for CNs  Neck: trachea midline, FROM, supple, no thyromegaly or lymphadenopathy  Cardiovascular: Irregular rhythm normal rate  Pulmonary: Anterior breath sounds clear bilaterally, no crackles or wheezing. No use of accessory muscles  GI: Abdomen soft, non-distended, non-tender  Extremities: Radial and DP pulses +2, no edema    Neurologic:  Neurologic:  -Mental status: Awake, alert, oriented to person, hospital. significant expressive aphasia, but able to state age/ year. Follows command. Able to name simple objects such as purple, watch, key.   -Cranial nerves:   II: Visual fields are full to confrontation.  III, IV, VI: Extraocular movements are intact without nystagmus. Pupils equally round and reactive to light  V:  Facial sensation V1-V3 equal and intact   VII: right facial asymmetry  Motor: Normal bulk and tone. RUE 4+/5, RLE 5-/5.  LUE 5/5, LLE 5/5.  Sensation: Intact to light touch bilaterally. Extinction to right on double simultaneous testing.  Gait: Normal     MEDICATIONS  (STANDING):  amLODIPine   Tablet 5 milliGRAM(s) Oral daily  apixaban 5 milliGRAM(s) Oral every 12 hours  atorvastatin 80 milliGRAM(s) Oral at bedtime  metoprolol succinate ER 25 milliGRAM(s) Oral daily  pantoprazole    Tablet 40 milliGRAM(s) Oral before breakfast    MEDICATIONS  (PRN):      Allergies    No Known Allergies    Intolerances        LABS:                        12.5   2.29  )-----------( 194      ( 29 Jul 2021 06:52 )             42.9     07-29    134<L>  |  109<H>  |  16  ----------------------------<  99  see  note Moderate  hemolysis  observed   |  13<L>  |  1.01    Ca    9.6      29 Jul 2021 06:52  Phos  3.9     07-28  Mg     1.8     07-28    TPro  6.8  /  Alb  2.8<L>  /  TBili  0.3  /  DBili  x   /  AST  see  note Moderate  hemolysis  observed  /  ALT  see   note Moderate  hemolysis  observed  /  AlkPhos  69  07-29          RADIOLOGY & ADDITIONAL TESTS:  Reviewed

## 2021-07-29 NOTE — DISCHARGE NOTE PROVIDER - HOSPITAL COURSE
88y Female with PMHx of HTN w/ noncompliance, brought to the hospital for AMS and confusion. Patient was last seen normal by her cousin 72h ago, she noticed facial asymmetry, but thought it is related to recent dental manipulation. Patient was found altered, and confused. In ED stroke code called initially then was cancelled as symptoms have been there >48h. NIHSS 9. CTH left frontal subcortical infarct. CTA neck was negative for large vessel disease but CTA head showed multifocal areas of irregularity, primarily involving the posterior circulation.  MRI confirmed presence of a left frontal acute/subacute infarct.  She was initially started on DAPT which was transitioned to Eliquis 5 mg BID given detection of a fib on monitor.  TTE was unremarkable apart from mildly enlarged LA; no PFO, normal EF. She is also on high-intensity statin (). MRI C spine for possible moderate-severe spinal stenosis in outpatient setting given lack of myelopathic signs/symptoms on exam. Pending acute rehab. Course complicated by Unclear etiology of leukopenia. Heme/onc on board. Workup pending  Stroke likely embolic given history of afib and non-compliance to medication.    Admission NIHSS 9  Discharge NIHSS 5    Current deficit Severe to moderate expressive aphasia, mild dysarthria, right sided facial droop  Workup done:  [x] HCT: left frontal acute/chronic subcortical infarct   [x] CTA head/Neck: Mild fusiform dilation of the right V4 artery to 4 mm as it traverses the foramen magnum without evidence of rupture, and 1 mm outpouchings superior to this focal dilation which may represent small saccular aneurysms or the origin of small caliber vessels.  disc herniation at C6-7 resulting in at least moderate spinal canal stenosis. MRI cervical spine can be obtained to evaluate for cord compression and cord signal abnormality.  [x] MRI brain w/out: Acute/subacute left frontal lobe infarction. No mass effect or midline shift. No evidence of hemorrhagic conversion.  [x] MRA Head and neck: : No large vessel occlusion. Mild stenosis of the distal right vertebral artery, mild spinal stenosis of the mid basilar artery and moderate stenosis of the right PCA as seen on recent CTA. No evidence of aneurysm of the proximal right vertebral artery   [x] Echo: Mildly dilated LA, No PFO  [x] PT/OT: AR    CORE MEASURES:  [6.3] A1c     [155 ] LDL           88y Female with PMHx of HTN w/ noncompliance, brought to the hospital for AMS and confusion. Patient was last seen normal by her cousin 72h ago, she noticed facial asymmetry, but thought it is related to recent dental manipulation. Patient was found altered, and confused. In ED stroke code called initially then was cancelled as symptoms have been there >48h. NIHSS 9. CTH left frontal subcortical infarct. CTA neck was negative for large vessel disease but CTA head showed multifocal areas of irregularity, primarily involving the posterior circulation.  MRI confirmed presence of a left frontal acute/subacute infarct. The stroke was ikely the source of patient's confusion. She was initially started on DAPT which was transitioned to Eliquis 5 mg BID given detection of a fib on monitor.  TTE was unremarkable apart from mildly enlarged LA; no PFO, normal EF. She is also on high-intensity statin (). MRI C spine for possible moderate-severe spinal stenosis in outpatient setting given lack of myelopathic signs/symptoms on exam. Pending acute rehab. Course complicated by Unclear etiology of leukopenia. Heme/onc on board. Workup pending  Stroke likely embolic given history of afib and non-compliance to medication.    Admission NIHSS 9  Discharge NIHSS 5    Current deficit Severe to moderate expressive aphasia, mild dysarthria, right sided facial droop  Workup done:  [x] HCT: left frontal acute/chronic subcortical infarct   [x] CTA head/Neck: Mild fusiform dilation of the right V4 artery to 4 mm as it traverses the foramen magnum without evidence of rupture, and 1 mm outpouchings superior to this focal dilation which may represent small saccular aneurysms or the origin of small caliber vessels.  disc herniation at C6-7 resulting in at least moderate spinal canal stenosis. MRI cervical spine can be obtained to evaluate for cord compression and cord signal abnormality.  [x] MRI brain w/out: Acute/subacute left frontal lobe infarction. No mass effect or midline shift. No evidence of hemorrhagic conversion.  [x] MRA Head and neck: : No large vessel occlusion. Mild stenosis of the distal right vertebral artery, mild spinal stenosis of the mid basilar artery and moderate stenosis of the right PCA as seen on recent CTA. No evidence of aneurysm of the proximal right vertebral artery   [x] Echo: Mildly dilated LA, No PFO  [x] PT/OT: AR    CORE MEASURES:  [6.3] A1c     [155 ] LDL

## 2021-07-29 NOTE — DIETITIAN INITIAL EVALUATION ADULT. - ADD RECOMMEND
1. Continue DASH TLC diet 2. Monitor %PO intake 3. Diet edu prn 4. Monitor labs: BMP, BG, lytes, replete prn

## 2021-07-29 NOTE — DISCHARGE NOTE PROVIDER - NSDCCPCAREPLAN_GEN_ALL_CORE_FT
PRINCIPAL DISCHARGE DIAGNOSIS  Diagnosis: Stroke  Assessment and Plan of Treatment: You have been diagnosed with stroke which is a condition that develops when part of the brain doesn't get enough blood and oxygen   - Take your blood thinners and cholesterol medication as prescribed. Do not skip doses and do not run low on your medication. call your doctor if you need refills   - If you have diabetes, take your diabetes medication as prescribed. Check your blood sugar level every day and contact your doctor if the levels arr high as your medication may need to be re-adjusted or some medication may need to be added   - If you have hypertension, take your blood pressure medication as prescribed. Measure your blood pressure with a cuff every day and write down the values. Call your doctors if the values are high despite medication as he may adjust your medication   - Avoid smoking and do not let anyone smoke next to you. If you are a smoker and find it hard to smoke seek help or call your doctors for referrals for counselling programs   - Follow up with your doctor as outpatient. he may prescribe regular lab work to keep track of your cholesterol and sugar levels in your blood. Do not skip appointments and always be compliant wit your doctor's advise  - Call 911 or report to the emergency department if you have sudden onset severe headache, vision problems such as blurry or double vision or vision that is going dark, vertigo, numbness or weakness anywhere in your body, slurred speech or difficulty walking        SECONDARY DISCHARGE DIAGNOSES  Diagnosis: Atrial fibrillation  Assessment and Plan of Treatment: Atrial Fibrillation  Atrial fibrillation is a type of irregular heartbeat (arrhythmia) where the heart quivers continuously in a chaotic pattern that makes the heart unable to pump blood normally. This can increase the risk for stroke, heart failure, and other heart-related conditions. Atrial fibrillation can be caused by a variety of conditions and may be temporary, intermittent, or permanent. Symptoms include feeling that your heart is beating rapidly or irregularly, chest discomfort, shortness of breath, or dizziness/lightheadedness that may be worse with exertion. Treatment is varied but may involve medication or electrical shock (cardioversion).  Please take blood thinner medication called eliquis to prevent clot formation. Do not skip any pill. TAKE MEDICATION AS PRESCRIBED  SEEK IMMEDIATE MEDICAL CARE IF YOU HAVE ANY OF THE FOLLOWING SYMPTOMS: chest pain, shortness of breath, abdominal pain, sweating, vomiting, blood in vomit/bowel movements/urine, dizziness/lightheadedness, weakness or numbness to face/arm/leg, trouble speaking or understanding, facial droop.

## 2021-07-29 NOTE — DISCHARGE NOTE PROVIDER - CARE PROVIDER_API CALL
Rossy Haider)  Neurology  [address] [address]  Phone: [phone]  Fax: [phone]  Follow Up Time: 1 month   Rossy Haider)  Neurology  [address] [address]  Phone: [phone]  Fax: [phone]  Scheduled Appointment: 08/23/2021 10:40 AM

## 2021-07-29 NOTE — DISCHARGE NOTE PROVIDER - NSDCMRMEDTOKEN_GEN_ALL_CORE_FT
amLODIPine 10 mg oral tablet: 1 tab(s) orally once a day  apixaban 5 mg oral tablet: 1 tab(s) orally every 12 hours  atorvastatin 40 mg oral tablet: 1 tab(s) orally once a day   metoprolol succinate 25 mg oral tablet, extended release: 1 tab(s) orally once a day  omeprazole 40 mg oral delayed release capsule: 1 cap(s) orally once a day

## 2021-07-29 NOTE — PROGRESS NOTE ADULT - SUBJECTIVE AND OBJECTIVE BOX
HOSPITALIST MEDICINE PROGRESS NOTE    Interval Notes:  bp well controlled overnight    Patient seen and examined at bedside. Resting in bed. Expressive aphasia remains.  No acute concerns or complaints.        PERTINENT REVIEW OF SYSTEMS:  Unable to reliably obtain    Allergies    No Known Allergies    Intolerances      MEDICATIONS  (STANDING):  amLODIPine   Tablet 5 milliGRAM(s) Oral daily  apixaban 5 milliGRAM(s) Oral every 12 hours  atorvastatin 80 milliGRAM(s) Oral at bedtime  metoprolol succinate ER 25 milliGRAM(s) Oral daily  pantoprazole    Tablet 40 milliGRAM(s) Oral before breakfast    MEDICATIONS  (PRN):      Vital Signs Last 24 Hrs  T(C): 36.7 (28 Jul 2021 09:18), Max: 37 (28 Jul 2021 00:40)  T(F): 98 (28 Jul 2021 09:18), Max: 98.6 (28 Jul 2021 00:40)  HR: 72 (28 Jul 2021 08:55) (66 - 82)  BP: 122/70 (28 Jul 2021 08:55) (122/70 - 193/93)  BP(mean): 90 (28 Jul 2021 08:55) (90 - 134)  RR: 18 (28 Jul 2021 08:55) (15 - 18)  SpO2: 99% (28 Jul 2021 08:55) (98% - 100%)    PHYSICAL EXAM:  General: Well developed; well nourished; in no acute distress  Eyes: Anicteric sclerae, moist conjunctivae, PERRLA, EOMI  HENT: Moist mucous membranes; R sided droop  Neck: Trachea midline, supple  Lungs: Normal respiratory effort, no intercostal retractions  Cardiovascular: RRR  Abdomen: Soft, non-tender non-distended; Normal bowel sounds; No rebound or guarding  Extremities: 5/5 strength b/l   Neurological: R Facial asymmetry, expressive aphasia  Skin: Warm and dry. No obvious rash    LABS:                                   12.5   2.29  )-----------( 194      ( 29 Jul 2021 06:52 )             42.9   07-29    134<L>  |  109<H>  |  16  ----------------------------<  99  see  note Moderate  hemolysis  observed   |  13<L>  |  1.01    Ca    9.6      29 Jul 2021 06:52  Phos  3.9     07-28  Mg     1.8     07-28    TPro  6.8  /  Alb  2.8<L>  /  TBili  0.3  /  DBili  x   /  AST  see  note Moderate  hemolysis  observed  /  ALT  see   note Moderate  hemolysis  observed  /  AlkPhos  69  07-29          RADIOLOGY & ADDITIONAL STUDIES:    MRA BRAIN: No large vessel occlusion. Mild stenosis of the distal right vertebral artery, mild spinal stenosis of the mid basilar artery and moderate stenosis of the right PCA as seen on recent CTA. No evidence of aneurysm of the proximal right vertebral artery corresponding to questionable outpouching on CTA which was therefore favored to represent atherosclerotic plaque.    MRA:  IMPRESSION:    MRI brain: Acute/subacute left frontal lobe infarction. No mass effect or midline shift. No evidence of hemorrhagic conversion.

## 2021-07-29 NOTE — DISCHARGE NOTE PROVIDER - PROVIDER TOKENS
PROVIDER:[TOKEN:[[id number]],FOLLOWUP:[1 month]] PROVIDER:[TOKEN:[[id number]],SCHEDULEDAPPT:[08/23/2021],SCHEDULEDAPPTTIME:[10:40 AM]]

## 2021-07-29 NOTE — DIETITIAN INITIAL EVALUATION ADULT. - OTHER INFO
58y Female with PMHx of HTN, brought to the hospital for AMS and confusion. CTH left frontal subcortical infarct. CTA neck was negative for large vessel disease but CTA head showed multifocal areas of irregularity. MRI confirmed presence of a left frontal acute/subacute infarct.  Pending acute rehab. Course complicated by Unclear etiology of leukopenia. Hematology consulted for work up of new onset leukopenia.    On assessment, pt seen resting in bed. Pt is aphasic, limited diet and wt hx. She reports UBW to be around 170 lbs but unable to recall specific numbers. She reports that her appetite was not great PTA but has since improved at current adm. Reported >75% PO intake at meals. Pt denies any discomfort or pain. No reported n/v/d/c. GI: WDL, last BM 7/28. Skin: zachary 21, pressure wise skin intact. Will continue to follow per RD protocol.

## 2021-07-29 NOTE — DIETITIAN INITIAL EVALUATION ADULT. - OTHER CALCULATIONS
IBW used to calculate energy needs due to pt's current body weight exceeding 120% of IBW (170%). Needs based on age and wt. [facility] standard of care for older adults.

## 2021-07-30 LAB
ANA PAT FLD IF-IMP: ABNORMAL
ANA TITR SER: ABNORMAL
B19V IGG SER-ACNC: 3.47 INDEX — HIGH (ref 0–0.9)
B19V IGG+IGM SER-IMP: POSITIVE
B19V IGG+IGM SER-IMP: SIGNIFICANT CHANGE UP
B19V IGM FLD-ACNC: 0.12 INDEX — SIGNIFICANT CHANGE UP (ref 0–0.9)
B19V IGM SER-ACNC: NEGATIVE — SIGNIFICANT CHANGE UP
COPPER SERPL-MCNC: 120 UG/DL — SIGNIFICANT CHANGE UP (ref 80–158)
COPPER SERPL-MCNC: 140 UG/DL — SIGNIFICANT CHANGE UP (ref 80–158)
GLUCOSE BLDC GLUCOMTR-MCNC: 132 MG/DL — HIGH (ref 70–99)

## 2021-07-30 PROCEDURE — 99231 SBSQ HOSP IP/OBS SF/LOW 25: CPT

## 2021-07-30 PROCEDURE — 99233 SBSQ HOSP IP/OBS HIGH 50: CPT

## 2021-07-30 RX ADMIN — APIXABAN 5 MILLIGRAM(S): 2.5 TABLET, FILM COATED ORAL at 18:15

## 2021-07-30 RX ADMIN — AMLODIPINE BESYLATE 5 MILLIGRAM(S): 2.5 TABLET ORAL at 05:40

## 2021-07-30 RX ADMIN — ATORVASTATIN CALCIUM 80 MILLIGRAM(S): 80 TABLET, FILM COATED ORAL at 21:51

## 2021-07-30 RX ADMIN — APIXABAN 5 MILLIGRAM(S): 2.5 TABLET, FILM COATED ORAL at 05:40

## 2021-07-30 RX ADMIN — Medication 25 MILLIGRAM(S): at 05:42

## 2021-07-30 RX ADMIN — PANTOPRAZOLE SODIUM 40 MILLIGRAM(S): 20 TABLET, DELAYED RELEASE ORAL at 05:40

## 2021-07-30 NOTE — PROGRESS NOTE ADULT - ATTENDING COMMENTS
The patient is a 58 year old female with a PMH Of HTN admitted 7/24 with confusion/aphasia with head CT showed possible age-determinate infarct in the left frontal region. CTA neck was negative for large vessel disease but CTA head showed multifocal areas of irregularity, primarily involving the posterior circulation.  MRI confirmed presence of a left frontal acute/subacute infarct.  MRA did not confirm aneurysm thought possibly seen on CTA head.  She was initially started on DAPT which was transitioned to Eliquis 5 mg BID given detection of a fib on monitor.  TTE was unremarkable apart from mildly enlarged LA; no PFO, normal EF.  She is also on high-intensity statin ().     MRI C spine for possible moderate-severe spinal stenosis in outpatient setting given lack of myelopathic signs/symptoms on exam.    Unclear etiology of leukopenia. Appreciate hematology input.  Monitor electrolytes, renal function. Trend CK.
The patient is a 58 year old female with a PMH Of HTN admitted 7/24 with confusion/aphasia with head CT showed possible age-determinate infarct in the left frontal region. CTA neck was negative for large vessel disease but CTA head showed multifocal areas of irregularity, primarily involving the posterior circulation.  MRI confirmed presence of a left frontal acute/subacute infarct.  MRA did not confirm aneurysm thought possibly seen on CTA head.  She was initially started on DAPT which was transitioned to Eliquis 5 mg BID given detection of a fib on monitor.  TTE was unremarkable apart from mildly enlarged LA; no PFO, normal EF.  She is also on high-intensity statin ().  Awaiting placement.    MRI C spine for possible moderate-severe spinal stenosis in outpatient setting given lack of myelopathic signs/symptoms on exam.    Unclear etiology of leukopenia. Appreciate hematology input. Will follow-up results of requested testing and touch base with infectious disease re viral study results.  Monitor electrolytes, renal function.
The patient is a 58 year old female with a PMH Of HTN admitted 7/24 with confusion/aphasia with head CT showed possible age-determinate infarct in the left frontal region. CTA neck was negative for large vessel disease but CTA head showed multifocal areas of irregularity, primarily involving the posterior circulation.  MRI confirmed presence of a left frontal acute/subacute infarct.  MRA did not confirm aneurysm thought possibly seen on CTA head.  She was initially started on DAPT which was transitioned to Eliquis 5 mg BID given detection of a fib on monitor.  TTE was unremarkable apart from mildly enlarged LA; no PFO, normal EF.  She is also on high-intensity statin ().     MRI C spine for possible moderate-severe spinal stenosis in outpatient setting given lack of myelopathic signs/symptoms on exam.    Unclear etiology of leukopenia. Appreciate hematology input. Will follow-up results of requested testing.  Monitor electrolytes, renal function.
The patient is a 58 year old female with a PMH Of HTN admitted 7/24 with confusion/aphasia with head CT showed possible age-determinate infarct in the left frontal region. CTA neck was negative for large vessel disease but CTA head showed multifocal areas of irregularity, primarily involving the posterior circulation.  MRI confirmed presence of a left frontal acute/subacute infarct.  MRA did not confirm aneurysm thought possibly seen on CTA head.  She was initially started on DAPT which was transitioned to Eliquis 5 mg BID given detection of a fib on monitor.  TTE was unremarkable apart from mildly enlarged LA; no PFO, normal EF.  She is also on high-intensity statin ().     MRI C spine for possible moderate-severe spinal stenosis in outpatient setting given lack of myelopathic signs/symptoms on exam.    Unclear etiology of leukopenia. Appreciate hematology input. Will follow-up results of requested testing and touch base with infectious disease re viral study results.  Monitor electrolytes, renal function.
The patient is a 58 year old female with a PMH Of HTN admitted 7/24 with confusion/aphasia with head CT showed possible age-determinate infarct in the left frontal region. CTA neck was negative for large vessel disease but CTA head showed multifocal areas of irregularity, primarily involving the posterior circulation.  MRI confirmed presence of a left frontal acute/subacute infarct.  MRA did not confirm aneurysm thought possibly seen on CTA head.  She was initially started on DAPT which was transitioned to Eliquis 5 mg BID given detection of a fib on monitor.  TTE is pending.  She is also on high-intensity statin ().     MRI C spine for possible moderate-severe spinal stenosis in outpatient setting given lack of myelopathic signs/symptoms on exam.    Unclear etiology of leukopenia. We will continue to monitor.  MOnitor electrolytes, renal function. Trend CK.

## 2021-07-30 NOTE — PROGRESS NOTE ADULT - ASSESSMENT
58y Female with PMHx of HTN, brought to the hospital for AMS and confusion, found to have L frontal infarct, hospital course complicated by leukopenia    Neuro  #CVA workup  #Acute left frontal lobe infarction  - Etiology likely embolic given her hx of afib, and non compliance to medication  - c/w Eliquis 5mg bid  - continue atorvastatin 80mg daily  - q4hr stroke neuro checks and vitals   - A1C 6.3, TSH 0.802,   - Stroke Code HCT Results: No acute intracranial hemorrhage or demarcated transcortical infarction. Left frontal subcortical white matter infarct is favored to be chronic.  - Stroke Code CTA Results: Mild fusiform dilation of the right V4 artery to 4 mm as it traverses the foramen magnum without evidence of rupture. Left paracentral disc herniation at C6-7 resulting in at least moderate spinal canal stenosis.   - MRA head and neck to further evaluate CTA findings -->No aneurysm  - MRI showing acute left frontal lobe infarction  - TTE no PFO, mildly dilated LA,   - Stroke education    Cards  #HTN  - Goal -150  - Takes amlodipine 10 mg at home (non compliant)  - Re-start amlodipine 5 mg daily, increase as tolerated  - TTE with bubble no PFO, good EF  - Stroke Code EKG Results: afib    #HLD  - high dose statin as above in CVA  - LDL results: 155    #Heme  -Isolated Leukopenia  -Smear: some atypical lymphocytes, otherwise mature WBCs noted  mainly lymphopenia, and mild neutropenia  -due to acute nature suspect acute cause  -HIV negative, HCV negative, ESR 31, vit b 12 543, folate 18  -Workup pending :Parvovirus, CMV, EBV, peripheral flow cytometry, AHMET , copper level    #Renal  #BRITT on CKD- Improving  -CPK noted to be downtrending  -Urine lytes  -s/p IVF    Pulm  - call provider if SPO2 < 94%    GI  #Nutrition/Fluids/Electrolytes   - replete K<4 and Mg <2  - Diet: DASH  - IVf 70cc for 10h-dc/ today    Endocrine  #DM  - A1C results: 6.3  - TSH results: 0.802    DVT Prophylaxis  - Eliquis  - SCDs for DVT prophylaxis     Dispo: acute rehab declined by insurance, pending peer to peer  Discussed daily hospital plans and goals with patient at bedside.   Discussed with Neurology Attending 58y Female with PMHx of HTN, brought to the hospital for AMS and confusion, found to have L frontal infarct, hospital course complicated by leukopenia    Neuro  #CVA workup  #Acute left frontal lobe infarction  - Etiology likely embolic given her hx of afib, and non compliance to medication  - c/w Eliquis 5mg bid  - continue atorvastatin 80mg daily  - q8hr general neuro checks and vitals   - A1C 6.3, TSH 0.802,   - Stroke Code HCT Results: No acute intracranial hemorrhage or demarcated transcortical infarction. Left frontal subcortical white matter infarct is favored to be chronic.  - Stroke Code CTA Results: Mild fusiform dilation of the right V4 artery to 4 mm as it traverses the foramen magnum without evidence of rupture. Left paracentral disc herniation at C6-7 resulting in at least moderate spinal canal stenosis.   - MRA head and neck to further evaluate CTA findings -->No aneurysm  - MRI showing acute left frontal lobe infarction  - TTE no PFO, mildly dilated LA,   - Stroke education    Cards  #HTN  - Goal -150  - Takes amlodipine 10 mg at home (non compliant)  - Re-start amlodipine 5 mg daily, increase as tolerated  - TTE with bubble no PFO, good EF  - Stroke Code EKG Results: afib    #HLD  - high dose statin as above in CVA  - LDL results: 155    #Heme  -Isolated Leukopenia  -Smear: some atypical lymphocytes, otherwise mature WBCs noted  mainly lymphopenia, and mild neutropenia  -due to acute nature suspect acute cause  -HIV negative, HCV negative, ESR 31, vit b 12 543, folate 18  -Workup pending :Parvovirus, CMV, EBV, peripheral flow cytometry, AHMET , copper level    #Renal  #BRITT on CKD- Improving  -CPK noted to be downtrending  -Urine lytes  -s/p IVF    Pulm  - call provider if SPO2 < 94%    GI  #Nutrition/Fluids/Electrolytes   - replete K<4 and Mg <2  - Diet: DASH  - IVf 70cc for 10h-dc/ today    Endocrine  #DM  - A1C results: 6.3  - TSH results: 0.802    DVT Prophylaxis  - Eliquis  - SCDs for DVT prophylaxis     Dispo: acute rehab declined by insurance, pending peer to peer  Discussed daily hospital plans and goals with patient at bedside.   Discussed with Neurology Attending

## 2021-07-30 NOTE — PROGRESS NOTE ADULT - SUBJECTIVE AND OBJECTIVE BOX
HOSPITALIST MEDICINE PROGRESS NOTE    Interval Notes:  neeta    Patient seen and examined at bedside. Resting in bed. Expressive aphasia remains.  No acute concerns or complaints.        PERTINENT REVIEW OF SYSTEMS:  12 point ROS negative    Allergies    No Known Allergies    Intolerances      MEDICATIONS  (STANDING):  amLODIPine   Tablet 5 milliGRAM(s) Oral daily  apixaban 5 milliGRAM(s) Oral every 12 hours  atorvastatin 80 milliGRAM(s) Oral at bedtime  metoprolol succinate ER 25 milliGRAM(s) Oral daily  pantoprazole    Tablet 40 milliGRAM(s) Oral before breakfast    MEDICATIONS  (PRN):        Vital Signs Last 24 Hrs  T(C): 36.8 (30 Jul 2021 05:49), Max: 36.8 (30 Jul 2021 05:49)  T(F): 98.3 (30 Jul 2021 05:49), Max: 98.3 (30 Jul 2021 05:49)  HR: 61 (30 Jul 2021 05:49) (61 - 76)  BP: 129/81 (30 Jul 2021 05:49) (123/75 - 133/84)  BP(mean): --  RR: 17 (30 Jul 2021 05:49) (16 - 17)  SpO2: 99% (30 Jul 2021 05:49) (98% - 100%)    PHYSICAL EXAM:  General: Well developed; well nourished; in no acute distress  Eyes: Anicteric sclerae, moist conjunctivae, PERRLA, EOMI  HENT: Moist mucous membranes; R sided droop  Neck: Trachea midline, supple  Lungs: Normal respiratory effort, no intercostal retractions  Cardiovascular: RRR  Abdomen: Soft, non-tender non-distended; Normal bowel sounds; No rebound or guarding  Extremities: 5/5 strength b/l   Neurological: R Facial asymmetry, expressive aphasia  Skin: Warm and dry. No obvious rash    LABS:                                   12.5   2.29  )-----------( 194      ( 29 Jul 2021 06:52 )             42.9   07-29    141  |  109<H>  |  17  ----------------------------<  129<H>  4.8   |  24  |  1.21    Ca    9.6      29 Jul 2021 16:38    TPro  6.9  /  Alb  3.6  /  TBili  0.3  /  DBili  x   /  AST  35  /  ALT  28  /  AlkPhos  72  07-29        RADIOLOGY & ADDITIONAL STUDIES:    MRA BRAIN: No large vessel occlusion. Mild stenosis of the distal right vertebral artery, mild spinal stenosis of the mid basilar artery and moderate stenosis of the right PCA as seen on recent CTA. No evidence of aneurysm of the proximal right vertebral artery corresponding to questionable outpouching on CTA which was therefore favored to represent atherosclerotic plaque.    MRA:  IMPRESSION:    MRI brain: Acute/subacute left frontal lobe infarction. No mass effect or midline shift. No evidence of hemorrhagic conversion.

## 2021-07-30 NOTE — PROGRESS NOTE ADULT - SUBJECTIVE AND OBJECTIVE BOX
INTERVAL HPI/OVERNIGHT EVENTS:  Patient was seen and examined at bedside. As per nurse and patient, no o/n events, patient frustrated with aphasia and lack of placement in rehab. Patient denies: fever, chills, dizziness, weakness, HA, Changes in vision, CP, palpitations, SOB, cough, N/V/D/C, dysuria, changes in bowel movements, LE edema. ROS otherwise negative.    VITAL SIGNS:  T(F): 98.2 (07-30-21 @ 14:14)  HR: 74 (07-30-21 @ 14:14)  BP: 123/80 (07-30-21 @ 14:14)  RR: 18 (07-30-21 @ 14:14)  SpO2: 99% (07-30-21 @ 14:14)  Wt(kg): --    PHYSICAL EXAM:    General: No acute distress, awake and alert  Eyes: Anicteric sclerae, moist conjunctivae, see below for CNs  Neck: trachea midline, FROM, supple, no thyromegaly or lymphadenopathy  Cardiovascular: Irregular rhythm normal rate  Pulmonary: Anterior breath sounds clear bilaterally, no crackles or wheezing. No use of accessory muscles  GI: Abdomen soft, non-distended, non-tender  Extremities: Radial and DP pulses +2, no edema    Neurologic:  Neurologic:  -Mental status: Awake, alert, oriented to person, hospital. significant expressive aphasia, but able to state age/ year. Follows command. Able to name simple objects such as purple, watch, key.   -Cranial nerves:   II: Visual fields are full to confrontation.  III, IV, VI: Extraocular movements are intact without nystagmus. Pupils equally round and reactive to light  V:  Facial sensation V1-V3 equal and intact   VII: right facial asymmetry  Motor: Normal bulk and tone. RUE 4+/5, RLE 5-/5.  LUE 5/5, LLE 5/5.  Sensation: Intact to light touch bilaterally. Extinction to right on double simultaneous testing.  Gait: Normal     MEDICATIONS  (STANDING):  amLODIPine   Tablet 5 milliGRAM(s) Oral daily  apixaban 5 milliGRAM(s) Oral every 12 hours  atorvastatin 80 milliGRAM(s) Oral at bedtime  metoprolol succinate ER 25 milliGRAM(s) Oral daily  pantoprazole    Tablet 40 milliGRAM(s) Oral before breakfast    MEDICATIONS  (PRN):      Allergies    No Known Allergies    Intolerances        LABS:                        12.5   2.29  )-----------( 194      ( 29 Jul 2021 06:52 )             42.9     07-29    141  |  109<H>  |  17  ----------------------------<  129<H>  4.8   |  24  |  1.21    Ca    9.6      29 Jul 2021 16:38    TPro  6.9  /  Alb  3.6  /  TBili  0.3  /  DBili  x   /  AST  35  /  ALT  28  /  AlkPhos  72  07-29          RADIOLOGY & ADDITIONAL TESTS:  Reviewed

## 2021-07-30 NOTE — PROGRESS NOTE ADULT - TIME BILLING
review of chart documentation and data; interview with patient; discussion of assessment plan with patient and multidisciplinary teams.

## 2021-07-31 LAB
ANION GAP SERPL CALC-SCNC: 9 MMOL/L — SIGNIFICANT CHANGE UP (ref 5–17)
BASOPHILS # BLD AUTO: 0.01 K/UL — SIGNIFICANT CHANGE UP (ref 0–0.2)
BASOPHILS NFR BLD AUTO: 0.3 % — SIGNIFICANT CHANGE UP (ref 0–2)
BUN SERPL-MCNC: 15 MG/DL — SIGNIFICANT CHANGE UP (ref 7–23)
CALCIUM SERPL-MCNC: 9.6 MG/DL — SIGNIFICANT CHANGE UP (ref 8.4–10.5)
CHLORIDE SERPL-SCNC: 108 MMOL/L — SIGNIFICANT CHANGE UP (ref 96–108)
CO2 SERPL-SCNC: 23 MMOL/L — SIGNIFICANT CHANGE UP (ref 22–31)
CREAT SERPL-MCNC: 1.24 MG/DL — SIGNIFICANT CHANGE UP (ref 0.5–1.3)
EOSINOPHIL # BLD AUTO: 0.04 K/UL — SIGNIFICANT CHANGE UP (ref 0–0.5)
EOSINOPHIL NFR BLD AUTO: 1.1 % — SIGNIFICANT CHANGE UP (ref 0–6)
GLUCOSE SERPL-MCNC: 125 MG/DL — HIGH (ref 70–99)
HCT VFR BLD CALC: 40.7 % — SIGNIFICANT CHANGE UP (ref 34.5–45)
HGB BLD-MCNC: 12.6 G/DL — SIGNIFICANT CHANGE UP (ref 11.5–15.5)
IMM GRANULOCYTES NFR BLD AUTO: 0.3 % — SIGNIFICANT CHANGE UP (ref 0–1.5)
LYMPHOCYTES # BLD AUTO: 0.65 K/UL — LOW (ref 1–3.3)
LYMPHOCYTES # BLD AUTO: 17.4 % — SIGNIFICANT CHANGE UP (ref 13–44)
MCHC RBC-ENTMCNC: 26.8 PG — LOW (ref 27–34)
MCHC RBC-ENTMCNC: 31 GM/DL — LOW (ref 32–36)
MCV RBC AUTO: 86.6 FL — SIGNIFICANT CHANGE UP (ref 80–100)
MONOCYTES # BLD AUTO: 0.37 K/UL — SIGNIFICANT CHANGE UP (ref 0–0.9)
MONOCYTES NFR BLD AUTO: 9.9 % — SIGNIFICANT CHANGE UP (ref 2–14)
NEUTROPHILS # BLD AUTO: 2.66 K/UL — SIGNIFICANT CHANGE UP (ref 1.8–7.4)
NEUTROPHILS NFR BLD AUTO: 71 % — SIGNIFICANT CHANGE UP (ref 43–77)
NRBC # BLD: 0 /100 WBCS — SIGNIFICANT CHANGE UP (ref 0–0)
PLATELET # BLD AUTO: 243 K/UL — SIGNIFICANT CHANGE UP (ref 150–400)
POTASSIUM SERPL-MCNC: 4 MMOL/L — SIGNIFICANT CHANGE UP (ref 3.5–5.3)
POTASSIUM SERPL-SCNC: 4 MMOL/L — SIGNIFICANT CHANGE UP (ref 3.5–5.3)
RBC # BLD: 4.7 M/UL — SIGNIFICANT CHANGE UP (ref 3.8–5.2)
RBC # FLD: 14.7 % — HIGH (ref 10.3–14.5)
SODIUM SERPL-SCNC: 140 MMOL/L — SIGNIFICANT CHANGE UP (ref 135–145)
WBC # BLD: 3.74 K/UL — LOW (ref 3.8–10.5)
WBC # FLD AUTO: 3.74 K/UL — LOW (ref 3.8–10.5)

## 2021-07-31 PROCEDURE — 99233 SBSQ HOSP IP/OBS HIGH 50: CPT

## 2021-07-31 RX ADMIN — PANTOPRAZOLE SODIUM 40 MILLIGRAM(S): 20 TABLET, DELAYED RELEASE ORAL at 06:07

## 2021-07-31 RX ADMIN — Medication 25 MILLIGRAM(S): at 06:07

## 2021-07-31 RX ADMIN — APIXABAN 5 MILLIGRAM(S): 2.5 TABLET, FILM COATED ORAL at 06:07

## 2021-07-31 RX ADMIN — APIXABAN 5 MILLIGRAM(S): 2.5 TABLET, FILM COATED ORAL at 18:04

## 2021-07-31 RX ADMIN — AMLODIPINE BESYLATE 5 MILLIGRAM(S): 2.5 TABLET ORAL at 06:07

## 2021-07-31 RX ADMIN — ATORVASTATIN CALCIUM 80 MILLIGRAM(S): 80 TABLET, FILM COATED ORAL at 22:47

## 2021-07-31 NOTE — PROGRESS NOTE ADULT - ASSESSMENT
58y Female with PMHx of HTN, brought to the hospital for AMS and confusion, found to have L frontal infarct, hospital course complicated by leukopenia    Neuro  #CVA workup  #Acute left frontal lobe infarction  - Etiology likely embolic given her hx of afib, and non compliance to medication  - c/w Eliquis 5mg bid  - continue atorvastatin 80mg daily  - q8hr general neuro checks and vitals   - A1C 6.3, TSH 0.802,   - Stroke Code HCT Results: No acute intracranial hemorrhage or demarcated transcortical infarction. Left frontal subcortical white matter infarct is favored to be chronic.  - Stroke Code CTA Results: Mild fusiform dilation of the right V4 artery to 4 mm as it traverses the foramen magnum without evidence of rupture. Left paracentral disc herniation at C6-7 resulting in at least moderate spinal canal stenosis.   - MRA head and neck to further evaluate CTA findings -->No aneurysm  - MRI showing acute left frontal lobe infarction  - TTE no PFO, mildly dilated LA,   - Stroke education    Cards  #HTN  - Goal -150  - Takes amlodipine 10 mg at home (non compliant)  - Re-start amlodipine 5 mg daily, increase as tolerated  - TTE with bubble no PFO, good EF  - Stroke Code EKG Results: afib    #HLD  - high dose statin as above in CVA  - LDL results: 155    #Heme  -Isolated Leukopenia  -Smear: some atypical lymphocytes, otherwise mature WBCs noted  mainly lymphopenia, and mild neutropenia  -due to acute nature suspect acute cause  -HIV negative, HCV negative, ESR 31, vit b 12 543, folate 18  -AHMET positive, sent dsDNA, cmp, cbc    #Renal  #BRITT on CKD- Improving  -CPK noted to be downtrending  -Urine lytes  -s/p IVF    Pulm  - call provider if SPO2 < 94%    GI  #Nutrition/Fluids/Electrolytes   - replete K<4 and Mg <2  - Diet: DASH  - IVf 70cc for 10h-dc/ today    Endocrine  #DM  - A1C results: 6.3  - TSH results: 0.802    DVT Prophylaxis  - Eliquis  - SCDs for DVT prophylaxis     Dispo: acute rehab declined by insurance, pending peer to peer  Discussed daily hospital plans and goals with patient at bedside.   Discussed with Neurology Attending

## 2021-07-31 NOTE — PROGRESS NOTE ADULT - SUBJECTIVE AND OBJECTIVE BOX
Hospitalist Consult    O/N: KYLEE  Interval History: expressive aphasia, can answer some yes or no questions. She denies pain, no chest pain, no shortness of breath. Difficulty otherwise assessing ROS.       OBJECTIVE  Vitals:  T(C): 36.6 (07-31-21 @ 05:00), Max: 37 (07-30-21 @ 21:00)  HR: 75 (07-31-21 @ 05:00) (70 - 75)  BP: 124/75 (07-31-21 @ 05:00) (110/72 - 124/75)  RR: 18 (07-31-21 @ 05:00) (17 - 18)  SpO2: 99% (07-31-21 @ 05:00) (95% - 99%)  Wt(kg): --    I/O:  I&O's Summary      PHYSICAL EXAM:  Appearance: NAD.   HEENT: No pallor noted.  Conjunctiva clear b/l. Moist oral mucosa.  Cardiovascular: RRR with no murmurs.  Respiratory: Lungs CTAB.   Gastrointestinal:  Soft, nontender. Non-distended. Non-rigid.	  Extremities: No edema b/l. No erythema b/l. LE WWP b/l.  Vascular: DP intact  Neurologic:  Alert and awake. Moving all extremities. R facial droop. Following commands. Expressive aphasia, not speaking full sentences, can shake head yes and no to some yes or no questions.   	  LABS:                        12.6   3.74  )-----------( 243      ( 31 Jul 2021 12:48 )             40.7     07-31    140  |  108  |  15  ----------------------------<  125<H>  4.0   |  23  |  1.24    Ca    9.6      31 Jul 2021 12:48    TPro  6.9  /  Alb  3.6  /  TBili  0.3  /  DBili  x   /  AST  35  /  ALT  28  /  AlkPhos  72  07-29          RADIOLOGY & ADDITIONAL TESTS:  Reviewed .    MEDICATIONS  (STANDING):  amLODIPine   Tablet 5 milliGRAM(s) Oral daily  apixaban 5 milliGRAM(s) Oral every 12 hours  atorvastatin 80 milliGRAM(s) Oral at bedtime  metoprolol succinate ER 25 milliGRAM(s) Oral daily  pantoprazole    Tablet 40 milliGRAM(s) Oral before breakfast    MEDICATIONS  (PRN):

## 2021-07-31 NOTE — PROGRESS NOTE ADULT - SUBJECTIVE AND OBJECTIVE BOX
INTERVAL HPI/OVERNIGHT EVENTS:  Patient was seen and examined at bedside. As per nurse and patient, no o/n events, patient frustrated with aphasia and lack of placement in rehab. Patient denies: fever, chills, dizziness, weakness, HA, Changes in vision, CP, palpitations, SOB, cough, N/V/D/C, dysuria, changes in bowel movements, LE edema. ROS otherwise negative.    VITAL SIGNS:  T(F): 97.9 (07-31-21 @ 05:00)  HR: 75 (07-31-21 @ 05:00)  BP: 124/75 (07-31-21 @ 05:00)  RR: 18 (07-31-21 @ 05:00)  SpO2: 99% (07-31-21 @ 05:00)  Wt(kg): --    PHYSICAL EXAM:    Constitutional:   HEENT: PERRL, EOMI, sclera non-icteric, neck supple, trachea midline, no masses, no JVD, MMM, good dentition  Respiratory: CTA b/l, good air entry b/l, no wheezing, no rhonchi, no rales, without accessory muscle use and no intercostal retractions  Cardiovascular: RRR, normal S1S2, no M/R/G  Gastrointestinal: soft, NTND, no masses palpable, BS normal  Extremities: Warm, well perfused, pulses equal bilateral upper and lower extremities, no edema, no clubbing. Capillary refill <2 sec  Neurological: AAOx3, CN Grossly intact  Skin: Normal temperature, warm, dry  Neurologic:  -Mental status: Awake, alert, oriented to person, hospital. significant expressive aphasia, but able to say short sentences, improved from yesterday  -Cranial nerves:   II: Visual fields are full to confrontation.  III, IV, VI: Extraocular movements are intact without nystagmus. Pupils equally round and reactive to light  V:  Facial sensation V1-V3 equal and intact   VII: right facial asymmetry  Motor: Normal bulk and tone. RUE 4+/5, RLE 5-/5.  LUE 5/5, LLE 5/5.  Sensation: Intact to light touch bilaterally. Extinction to right on double simultaneous testing.  Gait: Normal   MEDICATIONS  (STANDING):  amLODIPine   Tablet 5 milliGRAM(s) Oral daily  apixaban 5 milliGRAM(s) Oral every 12 hours  atorvastatin 80 milliGRAM(s) Oral at bedtime  metoprolol succinate ER 25 milliGRAM(s) Oral daily  pantoprazole    Tablet 40 milliGRAM(s) Oral before breakfast    MEDICATIONS  (PRN):      Allergies    No Known Allergies    Intolerances        LABS:    07-29    141  |  109<H>  |  17  ----------------------------<  129<H>  4.8   |  24  |  1.21    Ca    9.6      29 Jul 2021 16:38    TPro  6.9  /  Alb  3.6  /  TBili  0.3  /  DBili  x   /  AST  35  /  ALT  28  /  AlkPhos  72  07-29          RADIOLOGY & ADDITIONAL TESTS:  Reviewed

## 2021-07-31 NOTE — PROGRESS NOTE ADULT - ASSESSMENT
ASSESSMENT:  58F PMH HTN presented initially for altered mental status, facial asymmetry and expressive aphasia. Imaging demonstrated L frontal infarct, concerning for embolic source. NIHSS 9.     PLAN:  CVA:  MRI w/ Acute/subacute L frontal lobe infarction, concern for embolic source, NIHSS of 9 on initial presentation.   - CTA: mild fusiform dilation of R V4 artery to 4mm as it traverses foramen magnum w/out evidence of rupture, and 1mm outpouching superior to focal dilation which may represent small saccular aneurysms or the origin of small caliber vessels.   - continue eliquis, statin   - continue amlodipine and metoprolol  Afib: Concern for history of AFib given med reconcilation includes Metoprolol and xarelto continue eliquis and toprol   HTN: Continue amlodipine 5mg and Toprol  Leukopenia: Heme/onc following, AHMET mildly positive 1:160, additional work up with dsDNA, RNP, anti-smith and rheum evaluation, repeat today improving.   Elevated Cr: 1.24, has stabilized/Resolved. Cautious with nephrotoxic agents, encourage PO intake    Recommendations:  - Will add additional work up for positive AHMET and can have rheum evaluate   - Continue current BP regimen  - Obtain further collateral regarding Afib history, daily EKGs    Dispo: Pending peer to Peer for acute rehab

## 2021-08-01 LAB
CREAT ?TM UR-MCNC: 38 MG/DL — SIGNIFICANT CHANGE UP
SARS-COV-2 RNA SPEC QL NAA+PROBE: SIGNIFICANT CHANGE UP
SODIUM UR-SCNC: 22 MMOL/L — SIGNIFICANT CHANGE UP

## 2021-08-01 PROCEDURE — 99233 SBSQ HOSP IP/OBS HIGH 50: CPT

## 2021-08-01 RX ADMIN — APIXABAN 5 MILLIGRAM(S): 2.5 TABLET, FILM COATED ORAL at 17:43

## 2021-08-01 RX ADMIN — AMLODIPINE BESYLATE 5 MILLIGRAM(S): 2.5 TABLET ORAL at 06:22

## 2021-08-01 RX ADMIN — APIXABAN 5 MILLIGRAM(S): 2.5 TABLET, FILM COATED ORAL at 06:22

## 2021-08-01 RX ADMIN — PANTOPRAZOLE SODIUM 40 MILLIGRAM(S): 20 TABLET, DELAYED RELEASE ORAL at 06:22

## 2021-08-01 RX ADMIN — ATORVASTATIN CALCIUM 80 MILLIGRAM(S): 80 TABLET, FILM COATED ORAL at 21:41

## 2021-08-01 RX ADMIN — Medication 25 MILLIGRAM(S): at 06:22

## 2021-08-01 NOTE — PROGRESS NOTE ADULT - SUBJECTIVE AND OBJECTIVE BOX
Hospitalist Consult    Interval History: Expressive aphasia. Answering yes and no questions by shaking head. Denies pain, no chest pain, SOB. No headaches. Otherwise difficulty assessing ROS    OBJECTIVE  Vitals:  T(C): 37.1 (08-01-21 @ 05:24), Max: 37.1 (08-01-21 @ 05:24)  HR: 77 (08-01-21 @ 05:24) (77 - 78)  BP: 131/68 (08-01-21 @ 05:24) (108/70 - 131/68)  RR: 18 (08-01-21 @ 05:24) (16 - 18)  SpO2: 99% (08-01-21 @ 05:24) (98% - 100%)  Wt(kg): --    I/O:  I&O's Summary      PHYSICAL EXAM:  Appearance: NAD. Expressive aphasia.   HEENT: No pallor noted.  Conjunctiva clear b/l.   Cardiovascular: RRR with no murmurs.  Respiratory: Lungs CTAB. No accessory muscle use  Gastrointestinal:  Soft, nontender. Non-distended. Non-rigid. + bowel sounds	  Extremities: No edema b/l. No erythema b/l. LE WWP b/l.  Neurologic:  Alert and awake. Moving all extremities. R facial droop. Following commands. Expressive aphasia, can shake head yes/no  	  LABS:                        12.6   3.74  )-----------( 243      ( 31 Jul 2021 12:48 )             40.7     07-31    140  |  108  |  15  ----------------------------<  125<H>  4.0   |  23  |  1.24    Ca    9.6      31 Jul 2021 12:48            RADIOLOGY & ADDITIONAL TESTS:  Reviewed .    MEDICATIONS  (STANDING):  amLODIPine   Tablet 5 milliGRAM(s) Oral daily  apixaban 5 milliGRAM(s) Oral every 12 hours  atorvastatin 80 milliGRAM(s) Oral at bedtime  metoprolol succinate ER 25 milliGRAM(s) Oral daily  pantoprazole    Tablet 40 milliGRAM(s) Oral before breakfast    MEDICATIONS  (PRN):

## 2021-08-01 NOTE — PROGRESS NOTE ADULT - ASSESSMENT
58y Female with PMHx of HTN, brought to the hospital for AMS and confusion, found to have L frontal infarct, hospital course complicated by leukopenia    Neuro  #CVA workup  #Acute left frontal lobe infarction  - Etiology likely embolic given her hx of afib, and non compliance to medication  - c/w Eliquis 5mg bid  - continue atorvastatin 80mg daily  - q8hr general neuro checks and vitals   - A1C 6.3, TSH 0.802,   - Stroke Code HCT Results: No acute intracranial hemorrhage or demarcated transcortical infarction. Left frontal subcortical white matter infarct is favored to be chronic.  - Stroke Code CTA Results: Mild fusiform dilation of the right V4 artery to 4 mm as it traverses the foramen magnum without evidence of rupture. Left paracentral disc herniation at C6-7 resulting in at least moderate spinal canal stenosis.   - MRA head and neck to further evaluate CTA findings -->No aneurysm  - MRI showing acute left frontal lobe infarction  - TTE no PFO, mildly dilated LA,   - Stroke education    Cards  #HTN  - Goal -150  - Takes amlodipine 10 mg at home (non compliant)  - Re-start amlodipine 5 mg daily, increase as tolerated  - TTE with bubble no PFO, good EF  - Stroke Code EKG Results: afib    #HLD  - high dose statin as above in CVA  - LDL results: 155    #Heme  -Isolated Leukopenia improving 2.29 --> 3.74  -Smear: some atypical lymphocytes, otherwise mature WBCs noted  mainly lymphopenia, and mild neutropenia  -due to acute nature suspect acute cause  -HIV negative, HCV negative, ESR 31, vit b 12 543, folate 18  -AHMET positive, sent dsDNA, cmp, cbc    #Renal  #BRITT on CKD- Improving  -CPK noted to be downtrending  -Urine lytes  -s/p IVF    Pulm  - call provider if SPO2 < 94%    GI  #Nutrition/Fluids/Electrolytes   - replete K<4 and Mg <2  - Diet: DASH  - IVf 70cc for 10h-dc/ today    Endocrine  #DM  - A1C results: 6.3  - TSH results: 0.802    DVT Prophylaxis  - Eliquis  - SCDs for DVT prophylaxis     Dispo: acute rehab declined by insurance, pending peer to peer  Discussed daily hospital plans and goals with patient at bedside.   Discussed with Neurology Attending

## 2021-08-01 NOTE — PROGRESS NOTE ADULT - SUBJECTIVE AND OBJECTIVE BOX
Neurology Stroke Progress Note    INTERVAL HPI/OVERNIGHT EVENTS:  Patient seen and examined. Patient has no complaints at this time. Resting in bed comfortably.     MEDICATIONS  (STANDING):  amLODIPine   Tablet 5 milliGRAM(s) Oral daily  apixaban 5 milliGRAM(s) Oral every 12 hours  atorvastatin 80 milliGRAM(s) Oral at bedtime  metoprolol succinate ER 25 milliGRAM(s) Oral daily  pantoprazole    Tablet 40 milliGRAM(s) Oral before breakfast    MEDICATIONS  (PRN):      Allergies    No Known Allergies    Intolerances        Vital Signs Last 24 Hrs  T(C): 37.1 (01 Aug 2021 05:24), Max: 37.1 (01 Aug 2021 05:24)  T(F): 98.7 (01 Aug 2021 05:24), Max: 98.7 (01 Aug 2021 05:24)  HR: 77 (01 Aug 2021 05:24) (77 - 78)  BP: 131/68 (01 Aug 2021 05:24) (108/70 - 131/68)  BP(mean): --  RR: 18 (01 Aug 2021 05:24) (16 - 18)  SpO2: 99% (01 Aug 2021 05:24) (98% - 100%)    Physical exam:  General: No acute distress, awake and alert  Eyes: Anicteric sclerae, moist conjunctivae, see below for CNs  Neck: trachea midline, FROM, supple, no thyromegaly or lymphadenopathy  Cardiovascular: Regular rate and rhythm, no murmurs, rubs, or gallops. No carotid bruits.   Pulmonary: Anterior breath sounds clear bilaterally, no crackles or wheezing. No use of accessory muscles  GI: Abdomen soft, non-distended, non-tender  Extremities: Radial and DP pulses +2, no edema    Neurologic:  -Mental status: Awake, alert, oriented to person. Mildly dysarthric and aphasic . Recent and remote memory intact. Follows commands. Attention/concentration intact.   -Cranial nerves:   II: Visual fields are full to confrontation.  III, IV, VI: Extraocular movements are intact without nystagmus. Pupils equally round and reactive to light  V:  Facial sensation V1-V3 equal and intact   VII: Slight right nasolabial fold flattening   VIII: Hearing is bilaterally intact to finger rub  IX, X: Uvula is midline and soft palate rises symmetrically  XI: Head turning and shoulder shrug are intact.  XII: Tongue protrudes midline  Motor: Normal bulk and tone. No pronator drift. Strength bilateral upper extremity 5/5, bilateral lower extremities 5/5.  Rapid alternating movements intact and symmetric  Sensation: Intact to light touch bilaterally. No neglect or extinction on double simultaneous testing.  Coordination: No dysmetria on finger-to-nose and heel-to-shin bilaterally  Reflexes: Downgoing toes bilaterally       LABS:                        12.6   3.74  )-----------( 243      ( 31 Jul 2021 12:48 )             40.7     07-31    140  |  108  |  15  ----------------------------<  125<H>  4.0   |  23  |  1.24    Ca    9.6      31 Jul 2021 12:48            RADIOLOGY & ADDITIONAL TESTS:

## 2021-08-01 NOTE — PROGRESS NOTE ADULT - NSICDXPILOT_GEN_ALL_CORE
[place]

## 2021-08-01 NOTE — PROGRESS NOTE ADULT - ASSESSMENT
ASSESSMENT:  58F PMH HTN presented initially for altered mental status, facial asymmetry and expressive aphasia. Imaging demonstrated L frontal infarct, concerning for embolic source. NIHSS 9.     PLAN:  CVA:  MRI w/ Acute/subacute L frontal lobe infarction, concern for embolic source, NIHSS of 9 on initial presentation.   - CTA: mild fusiform dilation of R V4 artery to 4mm as it traverses foramen magnum w/out evidence of rupture, and 1mm outpouching superior to focal dilation which may represent small saccular aneurysms or the origin of small caliber vessels.   - continue statin   -Continue with eliquis  - continue amlodipine and metoprolol  Afib: Concern for history of AFib given med reconciliation includes Metoprolol and xarelto. Obtain collateral regarding cardiac history. TTE with LVH, mildly dilated LA. Neg bubble.   -Continue eliquis and toprol   HTN: Continue amlodipine 5mg and Toprol  Leukopenia: Heme/onc following, AHMET mildly positive 1:160, additional work up with dsDNA, RNP, anti-smith and rheum evaluation upon discharge.   Elevated Cr: 1.24, has stabilized/Resolved. Cautious with nephrotoxic agents, encourage PO intake    Recommendations:  - F/u work up for positive AHMET and can have rheum evaluate   - Continue current BP regimen  - Obtain further collateral regarding Afib history, daily EKGs    Dispo: Pending peer to Peer for acute rehab

## 2021-08-02 ENCOUNTER — TRANSCRIPTION ENCOUNTER (OUTPATIENT)
Age: 66
End: 2021-08-02

## 2021-08-02 VITALS
TEMPERATURE: 98 F | HEART RATE: 89 BPM | OXYGEN SATURATION: 98 % | SYSTOLIC BLOOD PRESSURE: 128 MMHG | DIASTOLIC BLOOD PRESSURE: 78 MMHG | RESPIRATION RATE: 16 BRPM

## 2021-08-02 LAB
ANION GAP SERPL CALC-SCNC: 10 MMOL/L — SIGNIFICANT CHANGE UP (ref 5–17)
BASOPHILS # BLD AUTO: 0.01 K/UL — SIGNIFICANT CHANGE UP (ref 0–0.2)
BASOPHILS NFR BLD AUTO: 0.3 % — SIGNIFICANT CHANGE UP (ref 0–2)
BUN SERPL-MCNC: 14 MG/DL — SIGNIFICANT CHANGE UP (ref 7–23)
CALCIUM SERPL-MCNC: 9.6 MG/DL — SIGNIFICANT CHANGE UP (ref 8.4–10.5)
CHLORIDE SERPL-SCNC: 108 MMOL/L — SIGNIFICANT CHANGE UP (ref 96–108)
CO2 SERPL-SCNC: 24 MMOL/L — SIGNIFICANT CHANGE UP (ref 22–31)
CONFIRM APTT STACLOT: POSITIVE
CREAT SERPL-MCNC: 1.09 MG/DL — SIGNIFICANT CHANGE UP (ref 0.5–1.3)
DRVVT SCREEN TO CONFIRM RATIO: SIGNIFICANT CHANGE UP
EOSINOPHIL # BLD AUTO: 0.03 K/UL — SIGNIFICANT CHANGE UP (ref 0–0.5)
EOSINOPHIL NFR BLD AUTO: 0.9 % — SIGNIFICANT CHANGE UP (ref 0–6)
GLUCOSE SERPL-MCNC: 113 MG/DL — HIGH (ref 70–99)
HCT VFR BLD CALC: 39.5 % — SIGNIFICANT CHANGE UP (ref 34.5–45)
HEMATOPATHOLOGY REPORT: SIGNIFICANT CHANGE UP
HGB BLD-MCNC: 12.6 G/DL — SIGNIFICANT CHANGE UP (ref 11.5–15.5)
IMM GRANULOCYTES NFR BLD AUTO: 0 % — SIGNIFICANT CHANGE UP (ref 0–1.5)
LA NT DPL PPP QL: 44.8 SEC — SIGNIFICANT CHANGE UP
LYMPHOCYTES # BLD AUTO: 0.66 K/UL — LOW (ref 1–3.3)
LYMPHOCYTES # BLD AUTO: 20.2 % — SIGNIFICANT CHANGE UP (ref 13–44)
MCHC RBC-ENTMCNC: 27.2 PG — SIGNIFICANT CHANGE UP (ref 27–34)
MCHC RBC-ENTMCNC: 31.9 GM/DL — LOW (ref 32–36)
MCV RBC AUTO: 85.3 FL — SIGNIFICANT CHANGE UP (ref 80–100)
MONOCYTES # BLD AUTO: 0.43 K/UL — SIGNIFICANT CHANGE UP (ref 0–0.9)
MONOCYTES NFR BLD AUTO: 13.2 % — SIGNIFICANT CHANGE UP (ref 2–14)
NEUTROPHILS # BLD AUTO: 2.13 K/UL — SIGNIFICANT CHANGE UP (ref 1.8–7.4)
NEUTROPHILS NFR BLD AUTO: 65.4 % — SIGNIFICANT CHANGE UP (ref 43–77)
NRBC # BLD: 0 /100 WBCS — SIGNIFICANT CHANGE UP (ref 0–0)
PLATELET # BLD AUTO: 208 K/UL — SIGNIFICANT CHANGE UP (ref 150–400)
POTASSIUM SERPL-MCNC: 3.8 MMOL/L — SIGNIFICANT CHANGE UP (ref 3.5–5.3)
POTASSIUM SERPL-SCNC: 3.8 MMOL/L — SIGNIFICANT CHANGE UP (ref 3.5–5.3)
RBC # BLD: 4.63 M/UL — SIGNIFICANT CHANGE UP (ref 3.8–5.2)
RBC # FLD: 14.5 % — SIGNIFICANT CHANGE UP (ref 10.3–14.5)
SODIUM SERPL-SCNC: 142 MMOL/L — SIGNIFICANT CHANGE UP (ref 135–145)
WBC # BLD: 3.26 K/UL — LOW (ref 3.8–10.5)
WBC # FLD AUTO: 3.26 K/UL — LOW (ref 3.8–10.5)

## 2021-08-02 PROCEDURE — 99233 SBSQ HOSP IP/OBS HIGH 50: CPT

## 2021-08-02 RX ADMIN — APIXABAN 5 MILLIGRAM(S): 2.5 TABLET, FILM COATED ORAL at 06:36

## 2021-08-02 RX ADMIN — PANTOPRAZOLE SODIUM 40 MILLIGRAM(S): 20 TABLET, DELAYED RELEASE ORAL at 06:35

## 2021-08-02 RX ADMIN — Medication 25 MILLIGRAM(S): at 06:36

## 2021-08-02 RX ADMIN — AMLODIPINE BESYLATE 5 MILLIGRAM(S): 2.5 TABLET ORAL at 06:35

## 2021-08-02 NOTE — DISCHARGE NOTE NURSING/CASE MANAGEMENT/SOCIAL WORK - NSDCPEELIQUISDIET_GEN_ALL_CORE
17      Orlando Parra III  [address]  [facility] [address]  : 2005     To Whom It May Concern:      Orlando had an appointment with Dr Moris Pandey today 17 at 8am. If you have any additional questions, please contact the clinic. Thank you.      Sincerely,          Albina Lowe, RN       [facility]  [address] [facility] [address]  [phone]              
Eat healthy foods you enjoy. Apixaban/Eliquis DOES NOT have a special diet. Limit your alcohol intake.

## 2021-08-02 NOTE — DISCHARGE NOTE NURSING/CASE MANAGEMENT/SOCIAL WORK - PATIENT PORTAL LINK FT
You can access the FollowMyHealth Patient Portal offered by [facility] by registering at the following website: [email or web address]. By joining [employer]’s FollowMyHealth portal, you will also be able to view your health information using other applications (apps) compatible with our system.

## 2021-08-03 LAB — DSDNA AB SER-ACNC: 17 IU/ML — SIGNIFICANT CHANGE UP

## 2021-08-23 ENCOUNTER — APPOINTMENT (OUTPATIENT)
Dept: NEUROLOGY | Facility: CLINIC | Age: 66
End: 2021-08-23

## 2021-08-23 NOTE — PHYSICAL EXAM
[FreeTextEntry1] : -Mental status: Awake, alert, oriented to person. Mildly dysarthric and aphasic . Recent and remote memory intact. Follows commands. Attention/concentration intact. \par -Cranial nerves: \par II: Visual fields are full to confrontation.\par III, IV, VI: Extraocular movements are intact without nystagmus. Pupils equally round and reactive to light\par V:  Facial sensation V1-V3 equal and intact \par VII: Slight right nasolabial fold flattening \par VIII: Hearing is bilaterally intact to finger rub\par IX, X: Uvula is midline and soft palate rises symmetrically\par XI: Head turning and shoulder shrug are intact.\par XII: Tongue protrudes midline\par Motor: Normal bulk and tone. No pronator drift. Strength bilateral upper extremity 5/5, bilateral lower extremities 5/5.\par Rapid alternating movements intact and symmetric\par Sensation: Intact to light touch bilaterally. No neglect or extinction on double simultaneous testing.\par Coordination: No dysmetria on finger-to-nose and heel-to-shin bilaterally\par Reflexes: Downgoing toes bilaterally \par

## 2021-08-23 NOTE — HISTORY OF PRESENT ILLNESS
[FreeTextEntry1] : Pt is a 66yoF with PMHx of HTN w/ noncompliance, brought to the hospital for AMS and confusion. Patient was last seen normal by her cousin 72h prior to Ed arrival. Cousin noticed facial asymmetry, but initially thought it was related to recent dental manipulation. Patient was found altered, and confused. In ED stroke code called initially then was cancelled as symptoms have been there >48h. NIHSS 9. CTH left frontal subcortical infarct. CTA neck was negative for large vessel disease but CTA head showed multifocal areas of irregularity, primarily involving the posterior circulation.  MRI confirmed presence of a left frontal acute/subacute infarct.  She was initially started on DAPT which was transitioned to Eliquis 5 mg BID given detection of a fib on monitor.  TTE was unremarkable apart from mildly enlarged LA; no PFO, normal EF. She is also on high-intensity statin (). MRI C spine for possible moderate-severe spinal stenosis in outpatient setting given lack of myelopathic signs/symptoms on exam. Pending acute rehab. Course complicated by Unclear etiology of leukopenia. Heme/ onc on board. Workup pending\par Stroke likely embolic given history of afib and non-compliance to medication.\par \par Workup done:\par [x] HCT: left frontal acute/chronic subcortical infarct \par [x] CTA head/Neck: Mild fusiform dilation of the right V4 artery to 4 mm as it traverses the foramen magnum without evidence of rupture, and 1 mm outpouchings superior to this focal dilation which may represent small saccular aneurysms or the origin of small caliber vessels.\par disc herniation at C6-7 resulting in at least moderate spinal canal stenosis. MRI cervical spine can be obtained to evaluate for cord compression and cord signal abnormality.\par [x] MRI brain w/out: Acute/subacute left frontal lobe infarction. No mass effect or midline shift. No evidence of hemorrhagic conversion.\par [x] MRA Head and neck: : No large vessel occlusion. Mild stenosis of the distal right vertebral artery, mild spinal stenosis of the mid basilar artery and moderate stenosis of the right PCA as seen on recent CTA. No evidence of aneurysm of the proximal right vertebral artery \par [x] Echo: Mildly dilated LA, No PFO\par [x] PT/OT: AR\par \par CORE MEASURES:\par [6.3] A1c     [155 ] LDL  \par \par D/c home with residual severe to moderate expressive aphasia, mild dysarthria, right sided facial droop\par \par D/c home on the following medications: \par amLODIPine 10 mg oral tablet: 1 tab(s) orally once a day\par apixaban 5 mg oral tablet: 1 tab(s) orally every 12 hours\par atorvastatin 40 mg oral tablet: 1 tab(s) orally once a day \par metoprolol succinate 25 mg oral tablet, extended release: 1 tab(s) orally once a day\par omeprazole 40 mg oral delayed release capsule: 1 cap(s) orally once a day\par

## 2021-09-14 RX ORDER — OMEPRAZOLE 10 MG/1
1 CAPSULE, DELAYED RELEASE ORAL
Qty: 0 | Refills: 0 | DISCHARGE

## 2021-09-14 RX ORDER — FONDAPARINUX SODIUM 2.5 MG/.5ML
1 INJECTION, SOLUTION SUBCUTANEOUS
Qty: 0 | Refills: 0 | DISCHARGE

## 2021-09-14 RX ORDER — METOPROLOL TARTRATE 50 MG
1 TABLET ORAL
Qty: 0 | Refills: 0 | DISCHARGE

## 2021-09-14 RX ORDER — AMLODIPINE BESYLATE 2.5 MG/1
1 TABLET ORAL
Qty: 0 | Refills: 0 | DISCHARGE

## 2022-01-12 PROBLEM — K63.5 POLYP OF COLON: Chronic | Status: ACTIVE | Noted: 2021-07-29

## 2022-01-12 PROBLEM — I10 ESSENTIAL (PRIMARY) HYPERTENSION: Chronic | Status: ACTIVE | Noted: 2021-07-29

## 2022-01-17 ENCOUNTER — LABORATORY RESULT (OUTPATIENT)
Age: 67
End: 2022-01-17

## 2022-01-19 ENCOUNTER — OUTPATIENT (OUTPATIENT)
Dept: OUTPATIENT SERVICES | Facility: HOSPITAL | Age: 67
LOS: 1 days | Discharge: HOME | End: 2022-01-19
Payer: MEDICARE

## 2022-01-19 ENCOUNTER — APPOINTMENT (OUTPATIENT)
Age: 67
End: 2022-01-19

## 2022-01-19 ENCOUNTER — RESULT REVIEW (OUTPATIENT)
Age: 67
End: 2022-01-19

## 2022-01-19 LAB — GLUCOSE BLDC GLUCOMTR-MCNC: 118 MG/DL — HIGH (ref 70–99)

## 2022-01-19 PROCEDURE — 45380 COLONOSCOPY AND BIOPSY: CPT | Mod: 59

## 2022-01-19 PROCEDURE — 45385 COLONOSCOPY W/LESION REMOVAL: CPT

## 2022-01-19 PROCEDURE — 45380 COLONOSCOPY AND BIOPSY: CPT | Mod: XS,PT

## 2022-01-19 PROCEDURE — 82962 GLUCOSE BLOOD TEST: CPT

## 2022-01-19 PROCEDURE — 88305 TISSUE EXAM BY PATHOLOGIST: CPT | Mod: 26

## 2022-01-19 PROCEDURE — 45385 COLONOSCOPY W/LESION REMOVAL: CPT | Mod: PT

## 2022-01-19 PROCEDURE — 88305 TISSUE EXAM BY PATHOLOGIST: CPT

## 2022-01-19 PROCEDURE — 43229 ESOPHAGOSCOPY LESION ABLATE: CPT

## 2022-01-20 LAB — SURGICAL PATHOLOGY STUDY: SIGNIFICANT CHANGE UP

## 2022-03-10 NOTE — ED ADULT TRIAGE NOTE - HEART RATE (BEATS/MIN)
90 Bactrim Pregnancy And Lactation Text: This medication is Pregnancy Category D and is known to cause fetal risk.  It is also excreted in breast milk.

## 2022-06-07 ENCOUNTER — APPOINTMENT (OUTPATIENT)
Dept: NEUROLOGY | Facility: CLINIC | Age: 67
End: 2022-06-07

## 2022-07-07 ENCOUNTER — APPOINTMENT (OUTPATIENT)
Dept: NEUROLOGY | Facility: CLINIC | Age: 67
End: 2022-07-07

## 2022-08-24 ENCOUNTER — APPOINTMENT (OUTPATIENT)
Dept: NEUROLOGY | Facility: CLINIC | Age: 67
End: 2022-08-24

## 2022-08-29 ENCOUNTER — APPOINTMENT (OUTPATIENT)
Dept: NEUROLOGY | Facility: CLINIC | Age: 67
End: 2022-08-29

## 2022-10-14 ENCOUNTER — APPOINTMENT (OUTPATIENT)
Dept: NEUROLOGY | Facility: CLINIC | Age: 67
End: 2022-10-14

## 2022-11-04 ENCOUNTER — APPOINTMENT (OUTPATIENT)
Dept: NEUROLOGY | Facility: CLINIC | Age: 67
End: 2022-11-04

## 2022-11-09 ENCOUNTER — APPOINTMENT (OUTPATIENT)
Dept: NEUROLOGY | Facility: CLINIC | Age: 67
End: 2022-11-09
Payer: MEDICARE

## 2022-11-09 ENCOUNTER — NON-APPOINTMENT (OUTPATIENT)
Age: 67
End: 2022-11-09

## 2022-11-09 VITALS
HEIGHT: 62 IN | TEMPERATURE: 97.8 F | DIASTOLIC BLOOD PRESSURE: 68 MMHG | BODY MASS INDEX: 32.39 KG/M2 | WEIGHT: 176 LBS | OXYGEN SATURATION: 99 % | SYSTOLIC BLOOD PRESSURE: 110 MMHG | HEART RATE: 61 BPM

## 2022-11-09 PROCEDURE — 99213 OFFICE O/P EST LOW 20 MIN: CPT

## 2022-11-09 PROCEDURE — 99203 OFFICE O/P NEW LOW 30 MIN: CPT

## 2022-11-09 NOTE — ASSESSMENT
[FreeTextEntry1] : The patient is a 67 year old female with a PMH Of HTN, T2DM, and ischemic stroke secondary to atrial fibrillation in 7/24/2021. She is doing well without recurrent symptoms.\par \par PLAN:\par --Repeat stroke labs at facility/PCP\par --Continue Eliquis, statin\par --Stressed importance of healthy lifestyle and aggressive control of vascular risk factors\par --Rec Speeth Therapy but patient defers for now.\par --RTC 3-6 months.

## 2022-11-09 NOTE — PHYSICAL EXAM
[FreeTextEntry1] : Alert.  Fully oriented.  Speech normal. Intermittent word-finding difficulty/nonfluency, few paraphasic errors. Mild difficulty following commands. Naming, repetition intact.  Cranial nerves II-XII are intact.  Motor exam reveals intact strength with individual muscle testing in bilateral upper and lower extremities.  Tone is normal.  Sensation is intact to light touch in distal extremities.  Finger-to-nose and heel-to-shin are intact.  Rapid alternating movements are normal in the upper and lower extremities.  Gait is normal. \par

## 2022-11-09 NOTE — HISTORY OF PRESENT ILLNESS
[FreeTextEntry1] : The patient is a 67 year old female with a PMH Of HTN and T2DM admitted 7/24/2021 with stroke. She is here for follow-up.  \par \par Briefly, the patient presented with confusion/aphasia for >48 hours.  MRI showed left frontal acute/subacute infarct as well as evidence of small vessel disease. CTA neck was negative for large vessel disease but CTA/MRA head showed multifocal areas of irregularity, concerning for ICAD, particularly involving the the posterior circulation. There were no definite aneurysms seen. She was initially started on DAPT which was transitioned to Eliquis 5 mg BID after a fib was detected on the bedside monitor.  TTE was unremarkable apart from mildly enlarged LA; no PFO, normal EF.  She was also discharged on high-intensity statin ().  She was discharged to rehab and was discharged after one month to a longterm facility.\par \par Since discharge, the patient denies any recurrent stroke-like symptoms.She recovered well. She continues on Eliquis, statin (atorva 40 mg) without side effects. No bleeding. She now lives at [facility]. She is eager to leave; SW is helping her find a place to live. She works with her caregiver on her language. \par

## 2022-12-30 ENCOUNTER — APPOINTMENT (OUTPATIENT)
Dept: NEPHROLOGY | Facility: CLINIC | Age: 67
End: 2022-12-30
Payer: MEDICARE

## 2022-12-30 ENCOUNTER — NON-APPOINTMENT (OUTPATIENT)
Age: 67
End: 2022-12-30

## 2022-12-30 VITALS
SYSTOLIC BLOOD PRESSURE: 112 MMHG | RESPIRATION RATE: 16 BRPM | OXYGEN SATURATION: 97 % | HEART RATE: 63 BPM | DIASTOLIC BLOOD PRESSURE: 70 MMHG

## 2022-12-30 PROCEDURE — 99205 OFFICE O/P NEW HI 60 MIN: CPT

## 2022-12-30 RX ORDER — AMLODIPINE BESYLATE 5 MG/1
5 TABLET ORAL
Refills: 0 | Status: ACTIVE | COMMUNITY

## 2022-12-30 RX ORDER — MELATONIN 3 MG
3 CAPSULE ORAL
Refills: 0 | Status: ACTIVE | COMMUNITY

## 2022-12-30 RX ORDER — SENNOSIDES 8.6 MG/1
8.6 CAPSULE, GELATIN COATED ORAL
Refills: 0 | Status: ACTIVE | COMMUNITY

## 2022-12-30 RX ORDER — DOCUSATE SODIUM 100 MG/1
100 CAPSULE ORAL
Refills: 0 | Status: ACTIVE | COMMUNITY

## 2022-12-30 RX ORDER — APIXABAN 5 MG/1
5 TABLET, FILM COATED ORAL
Qty: 60 | Refills: 5 | Status: ACTIVE | COMMUNITY

## 2022-12-30 RX ORDER — ATORVASTATIN CALCIUM 40 MG/1
40 TABLET, FILM COATED ORAL
Qty: 30 | Refills: 0 | Status: ACTIVE | COMMUNITY

## 2022-12-30 RX ORDER — OMEPRAZOLE 40 MG/1
40 CAPSULE, DELAYED RELEASE ORAL
Qty: 30 | Refills: 1 | Status: ACTIVE | COMMUNITY

## 2022-12-30 RX ORDER — METOPROLOL SUCCINATE 25 MG/1
25 TABLET, EXTENDED RELEASE ORAL
Qty: 30 | Refills: 3 | Status: ACTIVE | COMMUNITY

## 2022-12-30 RX ORDER — SITAGLIPTIN 50 MG/1
50 TABLET, FILM COATED ORAL
Refills: 0 | Status: ACTIVE | COMMUNITY

## 2022-12-30 RX ORDER — AMLODIPINE BESYLATE 10 MG/1
10 TABLET ORAL DAILY
Refills: 0 | Status: DISCONTINUED | COMMUNITY
Start: 2018-09-27 | End: 2022-12-30

## 2022-12-30 NOTE — ASSESSMENT
[FreeTextEntry1] : 67F with pmhx of HTN, DM, Stroke 7/2021 who presents today as a new patient for likely underlying CKD. \par \par #BRITT on CKD vs CKD\par Pt presenting with Cr of 1.38, creatinine appears to be close to 1.2 at least that has been in the trend on the most recent labs. Has a long standing hx of HTN and DM. BP well controlled, similarly DM well controlled. Will get repeat tests today to make sure stable\par -Repeat BMP w/ cystatin C today, UA w/ UPCR\par -Discussed limiting the use of NSAID's\par -Discussed limiting the amount of salt intake and maintaining a well controlled BP\par \par #HTN - currently well controlled\par #DM\par Pt is well controlled on currrent BP regimen as well as DM regimen although unclear if taking insulin. Not on any ACE/ARB or SGLT therapy\par -Will get labs today and consider adding on if required. \par \par #Secondary hyperparathyroidism\par -Calcium normal, phos slightly elevated\par -Check Vitamin D levels\par \par \par Will have patient follow up in 6 weeks on a Thursday afternoon and will call with labs and let her know if needs to be seen earlier\par \par

## 2022-12-30 NOTE — PHYSICAL EXAM
[General Appearance - Alert] : alert [General Appearance - In No Acute Distress] : in no acute distress [General Appearance - Well Nourished] : well nourished [General Appearance - Well Developed] : well developed [General Appearance - Well-Appearing] : healthy appearing [Sclera] : the sclera and conjunctiva were normal [PERRL With Normal Accommodation] : pupils were equal in size, round, and reactive to light [Extraocular Movements] : extraocular movements were intact [Outer Ear] : the ears and nose were normal in appearance [Oropharynx] : the oropharynx was normal [Neck Appearance] : the appearance of the neck was normal [Neck Cervical Mass (___cm)] : no neck mass was observed [Jugular Venous Distention Increased] : there was no jugular-venous distention [Auscultation Breath Sounds / Voice Sounds] : lungs were clear to auscultation bilaterally [Heart Rate And Rhythm] : heart rate was normal and rhythm regular [Heart Sounds] : normal S1 and S2 [Heart Sounds Gallop] : no gallops [Murmurs] : no murmurs [Heart Sounds Pericardial Friction Rub] : no pericardial rub [Bowel Sounds] : normal bowel sounds [Abdomen Soft] : soft [Abdomen Tenderness] : non-tender [Abnormal Walk] : normal gait [Nail Clubbing] : no clubbing  or cyanosis of the fingernails [Musculoskeletal - Swelling] : no joint swelling seen [Motor Tone] : muscle strength and tone were normal [Skin Color & Pigmentation] : normal skin color and pigmentation [Skin Turgor] : normal skin turgor [] : no rash [Deep Tendon Reflexes (DTR)] : deep tendon reflexes were 2+ and symmetric [Sensation] : the sensory exam was normal to light touch and pinprick [No Focal Deficits] : no focal deficits [Oriented To Time, Place, And Person] : oriented to person, place, and time [Impaired Insight] : insight and judgment were intact [Affect] : the affect was normal [FreeTextEntry1] : Has halting speech, some word finding difficulty but overall communicates well

## 2022-12-30 NOTE — CONSULT LETTER
[Dear  ___] : Dear  [unfilled], [Consult Letter:] : I had the pleasure of evaluating your patient, [unfilled]. [Please see my note below.] : Please see my note below. [Consult Closing:] : Thank you very much for allowing me to participate in the care of this patient.  If you have any questions, please do not hesitate to contact me. [FreeTextEntry1] : She is quite nicely controlled in terms of her CKD, her main issue is a chronic state of dehydration which we discussed at length, hopefully if that improves she won't have any progression [FreeTextEntry3] : Warm regards,\par Lon Taveras MD MA

## 2022-12-30 NOTE — HISTORY OF PRESENT ILLNESS
[FreeTextEntry1] : 67F with pmhx of HTN, DM, Stroke 7/2021 who presents today as a new patient for likely underlying CKD. Pt states she has had diabetes for >10 years and HTN as well. Per documentation is on insulin, but yoshin states she has not been taking it. Her last known A1C is 6.1 earlier this month. Pt states that she is not aware of any kidney issues in her family. Is not on an ACE/ARB or SGLT2i. Per patient has been in normal state of health with no acute complaints at the moment. Denies fever, chills, chest pain, palpitations, no leg swelling. Currently living in [facility].

## 2023-01-03 LAB
25(OH)D3 SERPL-MCNC: 40.1 NG/ML
ANION GAP SERPL CALC-SCNC: 13 MMOL/L
APPEARANCE: CLEAR
BACTERIA: NEGATIVE
BILIRUBIN URINE: NEGATIVE
BLOOD URINE: NEGATIVE
BUN SERPL-MCNC: 17 MG/DL
CALCIUM SERPL-MCNC: 9.2 MG/DL
CHLORIDE SERPL-SCNC: 106 MMOL/L
CO2 SERPL-SCNC: 25 MMOL/L
COLOR: YELLOW
CREAT SERPL-MCNC: 1.39 MG/DL
CREAT SPEC-SCNC: 652 MG/DL
CREAT/PROT UR: 0.1 RATIO
CYSTATIN C SERPL-MCNC: 1.76 MG/L
EGFR: 41 ML/MIN/1.73M2
GFR/BSA.PRED SERPLBLD CYS-BASED-ARV: 33 ML/MIN/1.73M2
GLUCOSE QUALITATIVE U: NEGATIVE
GLUCOSE SERPL-MCNC: 110 MG/DL
HYALINE CASTS: 0 /LPF
KETONES URINE: NORMAL
LEUKOCYTE ESTERASE URINE: ABNORMAL
MICROSCOPIC-UA: NORMAL
NITRITE URINE: NEGATIVE
PH URINE: 5.5
POTASSIUM SERPL-SCNC: 3.5 MMOL/L
PROT UR-MCNC: 39 MG/DL
PROTEIN URINE: ABNORMAL
RED BLOOD CELLS URINE: 4 /HPF
SODIUM SERPL-SCNC: 144 MMOL/L
SPECIFIC GRAVITY URINE: 1.04
SQUAMOUS EPITHELIAL CELLS: 9 /HPF
UROBILINOGEN URINE: ABNORMAL
WHITE BLOOD CELLS URINE: 4 /HPF

## 2023-02-10 ENCOUNTER — APPOINTMENT (OUTPATIENT)
Dept: NEPHROLOGY | Facility: CLINIC | Age: 68
End: 2023-02-10
Payer: MEDICARE

## 2023-02-10 VITALS — DIASTOLIC BLOOD PRESSURE: 72 MMHG | HEART RATE: 91 BPM | OXYGEN SATURATION: 98 % | SYSTOLIC BLOOD PRESSURE: 114 MMHG

## 2023-02-10 DIAGNOSIS — N18.30 CHRONIC KIDNEY DISEASE, STAGE 3 UNSPECIFIED: ICD-10-CM

## 2023-02-10 PROCEDURE — 99215 OFFICE O/P EST HI 40 MIN: CPT

## 2023-02-10 RX ORDER — METFORMIN HYDROCHLORIDE 625 MG/1
TABLET ORAL
Refills: 0 | Status: DISCONTINUED | COMMUNITY
End: 2023-02-10

## 2023-02-15 NOTE — HISTORY OF PRESENT ILLNESS
[FreeTextEntry1] : 67F with pmhx of HTN, DM, Stroke 7/2021 who presents today for followup of CKD\par \par Labs reviewed in detail with vivienne. Has done a great job increasing the water intake, sCr down to ~1, GFR of 60, encouraged to continue\par \par Discussed weight loss, suggested increase exercise, stairs, patient will attempt. \par

## 2023-02-15 NOTE — ASSESSMENT
[FreeTextEntry1] : 67F with pmhx of HTN, DM, Stroke 7/2021 who presents today for followup of CKD\par \par #BRITT on CKD vs CKD\par sCr improved with increased hydration, will continue to increase as possible still not at goal, sCr down to 1 likely baseline if able to keep hydrated\par -Discussed limiting the use of NSAID's\par -Discussed limiting the amount of salt intake and maintaining a well controlled BP\par \par #HTN - currently well controlled\par #DM\par Pt is well controlled on current BP regimen as well as DM regimen although unclear if taking insulin. Not on any ACE/ARB or SGLT therapy\par \par Discussed how to take BP appropriately at home. Advised not to smoke, drink caffeinated beverages or exercise within 30 minutes before measuring BP. Make sure cuff fits arm, as small cuffs can artificially raise BP.\par Make sure bladder is empty. With the cuff on your bare arm, sit in an upright position with back supported, feet flat on the floor and arm supported at heart level. Make sure the bottom of the cuff is directly above the bend of the elbow. Relax for about five minutes before taking a measurement. Resist the urge to talk or look at a cellphone. Wait one minute and retake BP, consider 3rd if elevated. Average the three readings and record in log book, bring to each check up. \par Bring device at next visit to ensure accuracy \par \par #Secondary hyperparathyroidism\par -Calcium normal, phos slightly elevated\par -Check Vitamin D levels\par \par RTC in 4-6 months

## 2023-02-15 NOTE — PHYSICAL EXAM
[General Appearance - Alert] : alert [General Appearance - In No Acute Distress] : in no acute distress [General Appearance - Well Nourished] : well nourished [General Appearance - Well Developed] : well developed [General Appearance - Well-Appearing] : healthy appearing [Sclera] : the sclera and conjunctiva were normal [PERRL With Normal Accommodation] : pupils were equal in size, round, and reactive to light [Extraocular Movements] : extraocular movements were intact [Outer Ear] : the ears and nose were normal in appearance [Oropharynx] : the oropharynx was normal [Neck Appearance] : the appearance of the neck was normal [Neck Cervical Mass (___cm)] : no neck mass was observed [Jugular Venous Distention Increased] : there was no jugular-venous distention [Auscultation Breath Sounds / Voice Sounds] : lungs were clear to auscultation bilaterally [Heart Rate And Rhythm] : heart rate was normal and rhythm regular [Heart Sounds] : normal S1 and S2 [Heart Sounds Gallop] : no gallops [Murmurs] : no murmurs [Heart Sounds Pericardial Friction Rub] : no pericardial rub [Bowel Sounds] : normal bowel sounds [Abdomen Soft] : soft [Abdomen Tenderness] : non-tender [No CVA Tenderness] : no ~M costovertebral angle tenderness [No Spinal Tenderness] : no spinal tenderness [Abnormal Walk] : normal gait [Nail Clubbing] : no clubbing  or cyanosis of the fingernails [Musculoskeletal - Swelling] : no joint swelling seen [Motor Tone] : muscle strength and tone were normal [Skin Color & Pigmentation] : normal skin color and pigmentation [Skin Turgor] : normal skin turgor [] : no rash [Sensation] : the sensory exam was normal to light touch and pinprick [No Focal Deficits] : no focal deficits [Oriented To Time, Place, And Person] : oriented to person, place, and time [Impaired Insight] : insight and judgment were intact [Affect] : the affect was normal [FreeTextEntry1] : Has halting speech, some word finding difficulty but overall communicates well

## 2023-02-17 ENCOUNTER — APPOINTMENT (OUTPATIENT)
Dept: NEUROLOGY | Facility: CLINIC | Age: 68
End: 2023-02-17
Payer: MEDICARE

## 2023-02-17 VITALS
HEIGHT: 62 IN | SYSTOLIC BLOOD PRESSURE: 155 MMHG | TEMPERATURE: 97.6 F | OXYGEN SATURATION: 98 % | DIASTOLIC BLOOD PRESSURE: 80 MMHG | WEIGHT: 181 LBS | HEART RATE: 85 BPM | BODY MASS INDEX: 33.31 KG/M2

## 2023-02-17 DIAGNOSIS — R47.01 APHASIA: ICD-10-CM

## 2023-02-17 PROCEDURE — 99214 OFFICE O/P EST MOD 30 MIN: CPT

## 2023-02-17 NOTE — HISTORY OF PRESENT ILLNESS
[FreeTextEntry1] : The patient is a 67 year old female with a PMH Of HTN and T2DM admitted 7/24/2021 with stroke in the setting of new-onset a fib (though also had ICAD), now on Eliquis. She is here for follow-up. \par \par Since our last visit, the patient denies any recurrent stroke-like symptoms. She continues on Eliquis, statin (atorva 40 mg) without side effects. No bleeding. She is still living at [facility]. She is eager to leave; SW is helping her find a place to live. She is tearful and upset today but unwilling to share details.

## 2023-02-17 NOTE — PHYSICAL EXAM
[FreeTextEntry1] : Alert. Fully oriented. Speech normal. Intermittent word-finding difficulty/nonfluency, few paraphasic errors. Mild difficulty following commands. Naming, repetition intact. Cranial nerves II-XII are intact. Motor exam reveals intact strength with individual muscle testing in bilateral upper and lower extremities. Tone is normal. Sensation is intact to light touch in distal extremities. Finger-to-nose and heel-to-shin are intact. Rapid alternating movements are normal in the upper and lower extremities. Gait is normal.  Extremely tearful, unwilling to discuss details

## 2023-02-17 NOTE — ASSESSMENT
[FreeTextEntry1] : The patient is a 67 year old female with a PMH Of HTN, T2DM, and ischemic stroke secondary to atrial fibrillation in 7/24/2021. She is doing well without recurrent symptoms.\par \par PLAN:\par --Repeat stroke labs at facility/PCP - facility to send for my review at next visit.\par --Continue Eliquis, statin\par --Recommend she monitor mood closely. Currently, no intent to harm herself/others. Rec she seek support \par --Stressed importance of healthy lifestyle and aggressive control of vascular risk factors\par --Rec Speeth Therapy but patient defers for now.\par --RTC 3-6 months. \par \par  Neuraxial Block

## 2023-05-14 NOTE — ED PROVIDER NOTE - CADM POA CENTRAL LINE
No You can access the FollowMyHealth Patient Portal offered by  by registering at the following website: [email or web address]. By joining [employer]’s FollowMyHealth portal, you will also be able to view your health information using other applications (apps) compatible with our system.

## 2023-07-14 ENCOUNTER — APPOINTMENT (OUTPATIENT)
Dept: NEPHROLOGY | Facility: CLINIC | Age: 68
End: 2023-07-14
Payer: MEDICARE

## 2023-07-14 VITALS
HEART RATE: 80 BPM | WEIGHT: 179 LBS | BODY MASS INDEX: 32.74 KG/M2 | RESPIRATION RATE: 14 BRPM | DIASTOLIC BLOOD PRESSURE: 60 MMHG | SYSTOLIC BLOOD PRESSURE: 110 MMHG

## 2023-07-14 PROCEDURE — 99215 OFFICE O/P EST HI 40 MIN: CPT

## 2023-07-16 NOTE — HISTORY OF PRESENT ILLNESS
[FreeTextEntry1] : 67 yo F with pmhx of HTN, DM, Stroke 7/2021 who presents today for followup of CKD3B\par \par Presenting for followup \par Not exercising as much as we discussed. Still trying to take in enough water. \par \par Reviewed diet and exercise in detail, feels can increase as discussed.

## 2023-07-16 NOTE — ASSESSMENT
[FreeTextEntry1] : 69 yo F with pmhx of HTN, DM, Stroke 7/2021 who presents today for followup of CKD\par \par BRITT on CKD vs CKD progression\par - sCr does improve with hydration but patient has trouble keeping up, may need to have further rhelp to increase. sCr cole 1 but has not been in some time\par - Avoiding NSAIDs\par - Limiting salt intake and increasing water intake and exercise\par \par HTN - currently at goal\par Discussed how to take BP appropriately at home. Advised not to smoke, drink caffeinated beverages or exercise within 30 minutes before measuring BP. Make sure cuff fits arm, as small cuffs can artificially raise BP.\par Make sure bladder is empty. With the cuff on your bare arm, sit in an upright position with back supported, feet flat on the floor and arm supported at heart level. Make sure the bottom of the cuff is directly above the bend of the elbow. Relax for about five minutes before taking a measurement. Resist the urge to talk or look at a cellphone. Wait one minute and retake BP, consider 3rd if elevated. Average the three readings and record in log book, bring to each check up. \par Bring device at next visit to ensure accuracy \par \par DM - currently at goal\par \par MBD-CKD - check phos and vitamin d, pth today\par \par Anemia-CKD - check ferritin iron sat today\par \par Dehydration - increase water intake\par \par Weight loss - increase exercise and caloric intake\par \par RTC in 3-5 months

## 2023-07-17 LAB
25(OH)D3 SERPL-MCNC: 44.2 NG/ML
ALBUMIN SERPL ELPH-MCNC: 4 G/DL
ALP BLD-CCNC: 64 U/L
ALT SERPL-CCNC: 10 U/L
ANION GAP SERPL CALC-SCNC: 13 MMOL/L
AST SERPL-CCNC: 15 U/L
BILIRUB SERPL-MCNC: 0.4 MG/DL
BUN SERPL-MCNC: 17 MG/DL
CALCIUM SERPL-MCNC: 9.4 MG/DL
CHLORIDE SERPL-SCNC: 107 MMOL/L
CO2 SERPL-SCNC: 24 MMOL/L
CREAT SERPL-MCNC: 1.58 MG/DL
CYSTATIN C SERPL-MCNC: 1.82 MG/L
EGFR: 35 ML/MIN/1.73M2
ESTIMATED AVERAGE GLUCOSE: 140 MG/DL
GFR/BSA.PRED SERPLBLD CYS-BASED-ARV: 32 ML/MIN/1.73M2
GLUCOSE SERPL-MCNC: 107 MG/DL
HBA1C MFR BLD HPLC: 6.5 %
PHOSPHATE SERPL-MCNC: 4 MG/DL
POTASSIUM SERPL-SCNC: 3.7 MMOL/L
PROT SERPL-MCNC: 6.8 G/DL
SODIUM SERPL-SCNC: 144 MMOL/L

## 2023-08-15 ENCOUNTER — APPOINTMENT (OUTPATIENT)
Dept: NEUROLOGY | Facility: CLINIC | Age: 68
End: 2023-08-15
Payer: MEDICARE

## 2023-08-15 VITALS
TEMPERATURE: 97.5 F | BODY MASS INDEX: 32.76 KG/M2 | HEIGHT: 62 IN | OXYGEN SATURATION: 98 % | WEIGHT: 178 LBS | SYSTOLIC BLOOD PRESSURE: 121 MMHG | DIASTOLIC BLOOD PRESSURE: 81 MMHG | HEART RATE: 86 BPM

## 2023-08-15 PROCEDURE — 99214 OFFICE O/P EST MOD 30 MIN: CPT

## 2023-08-16 NOTE — HISTORY OF PRESENT ILLNESS
[FreeTextEntry1] : The patient is a 67 year old female with a PMH of HTN and T2DM admitted 7/24/2021 with stroke in the setting of new-onset a fib (though also had ICAD), now on Eliquis. She is here for follow-up.   Since our last visit, the patient denies any recurrent stroke-like symptoms. She continues on Eliquis and Atorvastatin without any bleeding, bruising or leg cramps. She reports slightly improved mood since last visit and is not interested in medication to improve her mood. She has not been in contact with her family. She is interested in obtaining a note to advocate for her release from the [facility] where she is currently living at with the help of social work.

## 2023-08-16 NOTE — PHYSICAL EXAM
[FreeTextEntry1] : Alert. Fully oriented. Speech normal. Intermittent word-finding difficulty/nonfluency, few paraphasic errors. Mild difficulty following commands. Naming, repetition intact. Cranial nerves II-XII are intact. Motor exam reveals intact strength with individual muscle testing in bilateral upper and lower extremities. Slight R pronator drift. Tone is normal. Sensation is intact to light touch in distal extremities. Finger-to-nose and heel-to-shin are intact. Rapid alternating movements are normal in the upper and lower extremities. Gait is normal.

## 2023-08-16 NOTE — ASSESSMENT
[FreeTextEntry1] : The patient is a 67 year old female with a PMH Of HTN, T2DM, and ischemic stroke secondary to atrial fibrillation in 7/24/2021. She is doing well without recurrent symptoms. I am concerned with her living independently with additional services.  PLAN: --Continue Eliquis and Atorvastatin --Referral to neuropsych for testing prior to consideration of release from living facility; will need assistance if she returns to independent living --Stressed importance of healthy lifestyle and aggressive control of vascular risk factors --RTO in 4 months

## 2023-09-21 ENCOUNTER — NON-APPOINTMENT (OUTPATIENT)
Age: 68
End: 2023-09-21

## 2023-09-27 ENCOUNTER — OUTPATIENT (OUTPATIENT)
Dept: OUTPATIENT SERVICES | Facility: HOSPITAL | Age: 68
LOS: 1 days | Discharge: HOME | End: 2023-09-27
Payer: MEDICARE

## 2023-09-27 ENCOUNTER — RESULT REVIEW (OUTPATIENT)
Age: 68
End: 2023-09-27

## 2023-09-27 ENCOUNTER — APPOINTMENT (OUTPATIENT)
Age: 68
End: 2023-09-27

## 2023-09-27 VITALS
DIASTOLIC BLOOD PRESSURE: 87 MMHG | WEIGHT: 177.91 LBS | HEIGHT: 62 IN | RESPIRATION RATE: 16 BRPM | TEMPERATURE: 97 F | HEART RATE: 74 BPM | OXYGEN SATURATION: 98 % | SYSTOLIC BLOOD PRESSURE: 142 MMHG

## 2023-09-27 LAB — GLUCOSE BLDC GLUCOMTR-MCNC: 118 MG/DL — HIGH (ref 70–99)

## 2023-09-27 PROCEDURE — 82962 GLUCOSE BLOOD TEST: CPT

## 2023-09-27 PROCEDURE — 45380 COLONOSCOPY AND BIOPSY: CPT | Mod: XS,PT

## 2023-09-27 PROCEDURE — 45385 COLONOSCOPY W/LESION REMOVAL: CPT | Mod: PT

## 2023-09-27 PROCEDURE — 45390 COLONOSCOPY W/RESECTION: CPT

## 2023-09-27 PROCEDURE — 88305 TISSUE EXAM BY PATHOLOGIST: CPT | Mod: 26

## 2023-09-27 PROCEDURE — 88305 TISSUE EXAM BY PATHOLOGIST: CPT

## 2023-09-27 NOTE — PRE-ANESTHESIA EVALUATION ADULT - NSANTHPMHFT_GEN_ALL_CORE
PMH of HTN and T2DM admitted 7/24/2021 with stroke in the setting of new-onset a fib (though also had ICAD), now on Eliquis    Active Problems   Acute ischemic stroke (434.91) (I63.9)  Aphasia (784.3) (R47.01)  Atrial fibrillation, unspecified type (427.31) (I48.91)  CKD (chronic kidney disease) stage 2, GFR 60-89 ml/min (585.2) (N18.2)  Diabetes mellitus, type 2 (250.00) (E11.9)  Hyperparathyroidism, secondary (588.81) (N25.81)  Hypertension, unspecified type (401.9) (I10)  Ischemic stroke (434.91) (I63.9)

## 2023-09-27 NOTE — PRE-ANESTHESIA EVALUATION ADULT - NSANTHINPATMEDSFT_GEN_ALL_CORE
amLODIPine Besylate 5 MG Oral Tablet  Atorvastatin Calcium 40 MG Oral Tablet; TAKE 1 TABLET AT BEDTIME  Colace 100 MG Oral Capsule  Eliquis 5 MG Oral Tablet; 1 tablet twice daily  Januvia 50 MG Oral Tablet  Melatonin 3 MG Oral Capsule  Omeprazole 40 MG Oral Capsule Delayed Release; TAKE 1 CAPSULE Every morning  Senna 8.6 MG Oral Capsule; TAKE AS DIRECTED  Toprol XL 25 MG Oral Tablet Extended Release 24 Hour; TAKE 1 TABLET BY MOUTH  EVERY DAY

## 2023-09-27 NOTE — PRE-ANESTHESIA EVALUATION ADULT - NSANTHPEFT_GEN_ALL_CORE
Constitutional: Alert and in no acute distress.  Mouth: Mouth opening within normal limits.   Neck: The appearance of the neck was normal, no neck mass was observed. Thyromental distance within normal limits. Range of motion of neck is within normal limits.  Respiratory: Unlabored breathing.  Neurological: Some word finding difficulty, mild difficulty following commands, R pronator drift,  Psychiatric: Oriented to person, place, and time, insight and judgment were intact and the affect was normal.  Exercise Tolerance: MET>4.

## 2023-10-02 LAB — SURGICAL PATHOLOGY STUDY: SIGNIFICANT CHANGE UP

## 2023-10-02 NOTE — DIETITIAN INITIAL EVALUATION ADULT. - NUTRITION DIAGNOSIS
----- Message from Ashanti Perea MA sent at 10/2/2023 10:35 AM CDT -----  Name of Who is Calling:JOYA MUNOZ [[id number]]                What is the request in detail: Pt is asking if she can get 10mg of oxybutynin (DITROPAN) 5 MG Tab. Pt states the 5mg does not work and has been taking 2 to make it the 10mg and is out of the medication. Please assist.                Can the clinic reply by [facility]: No                [facility] Back [facility] [phone]     yes...

## 2023-10-03 ENCOUNTER — NON-APPOINTMENT (OUTPATIENT)
Age: 68
End: 2023-10-03

## 2023-11-02 ENCOUNTER — APPOINTMENT (OUTPATIENT)
Dept: NEPHROLOGY | Facility: CLINIC | Age: 68
End: 2023-11-02
Payer: MEDICARE

## 2023-11-02 VITALS — DIASTOLIC BLOOD PRESSURE: 78 MMHG | RESPIRATION RATE: 14 BRPM | SYSTOLIC BLOOD PRESSURE: 117 MMHG | HEART RATE: 88 BPM

## 2023-11-02 DIAGNOSIS — I63.9 CEREBRAL INFARCTION, UNSPECIFIED: ICD-10-CM

## 2023-11-02 PROCEDURE — 99215 OFFICE O/P EST HI 40 MIN: CPT

## 2023-11-06 LAB
25(OH)D3 SERPL-MCNC: 34.6 NG/ML
ALBUMIN SERPL ELPH-MCNC: 4.3 G/DL
ALP BLD-CCNC: 66 U/L
ALT SERPL-CCNC: 9 U/L
ANION GAP SERPL CALC-SCNC: 11 MMOL/L
APPEARANCE: CLEAR
AST SERPL-CCNC: 17 U/L
BACTERIA: ABNORMAL /HPF
BASOPHILS # BLD AUTO: 0.02 K/UL
BASOPHILS NFR BLD AUTO: 0.4 %
BILIRUB SERPL-MCNC: 0.4 MG/DL
BILIRUBIN URINE: NEGATIVE
BLOOD URINE: NEGATIVE
BUN SERPL-MCNC: 17 MG/DL
CALCIUM SERPL-MCNC: 9.5 MG/DL
CALCIUM SERPL-MCNC: 9.5 MG/DL
CAST: 8 /LPF
CHLORIDE SERPL-SCNC: 103 MMOL/L
CO2 SERPL-SCNC: 28 MMOL/L
COLOR: NORMAL
CREAT SERPL-MCNC: 1.24 MG/DL
CREAT SPEC-SCNC: 343 MG/DL
CREAT SPEC-SCNC: 343 MG/DL
CREAT/PROT UR: 0.1 RATIO
CYSTATIN C SERPL-MCNC: 1.62 MG/L
EGFR: 47 ML/MIN/1.73M2
EOSINOPHIL # BLD AUTO: 0.07 K/UL
EOSINOPHIL NFR BLD AUTO: 1.5 %
EPITHELIAL CELLS: 5 /HPF
ESTIMATED AVERAGE GLUCOSE: 151 MG/DL
GFR/BSA.PRED SERPLBLD CYS-BASED-ARV: 37 ML/MIN/1.73M2
GLUCOSE QUALITATIVE U: NEGATIVE MG/DL
GLUCOSE SERPL-MCNC: 106 MG/DL
HBA1C MFR BLD HPLC: 6.9 %
HCT VFR BLD CALC: 39 %
HGB BLD-MCNC: 12.1 G/DL
HYALINE CASTS: PRESENT
IMM GRANULOCYTES NFR BLD AUTO: 0.4 %
KETONES URINE: ABNORMAL MG/DL
LEUKOCYTE ESTERASE URINE: ABNORMAL
LYMPHOCYTES # BLD AUTO: 0.81 K/UL
LYMPHOCYTES NFR BLD AUTO: 17.4 %
MAN DIFF?: NORMAL
MCHC RBC-ENTMCNC: 27.1 PG
MCHC RBC-ENTMCNC: 31 GM/DL
MCV RBC AUTO: 87.4 FL
MICROALBUMIN 24H UR DL<=1MG/L-MCNC: <1.2 MG/DL
MICROALBUMIN/CREAT 24H UR-RTO: NORMAL MG/G
MICROSCOPIC-UA: NORMAL
MONOCYTES # BLD AUTO: 0.49 K/UL
MONOCYTES NFR BLD AUTO: 10.5 %
NEUTROPHILS # BLD AUTO: 3.24 K/UL
NEUTROPHILS NFR BLD AUTO: 69.8 %
NITRITE URINE: NEGATIVE
PARATHYROID HORMONE INTACT: 122 PG/ML
PH URINE: 5.5
PHOSPHATE SERPL-MCNC: 3.8 MG/DL
PLATELET # BLD AUTO: 267 K/UL
POTASSIUM SERPL-SCNC: 3.5 MMOL/L
PROT SERPL-MCNC: 7 G/DL
PROT UR-MCNC: 21 MG/DL
PROTEIN URINE: 30 MG/DL
RBC # BLD: 4.46 M/UL
RBC # FLD: 14.2 %
RED BLOOD CELLS URINE: 4 /HPF
REVIEW: NORMAL
SODIUM ?TM SUB UR QN: <20 MMOL/L
SODIUM SERPL-SCNC: 141 MMOL/L
SPECIFIC GRAVITY URINE: 1.03
UROBILINOGEN URINE: 0.2 MG/DL
WBC # FLD AUTO: 4.65 K/UL
WHITE BLOOD CELLS URINE: 5 /HPF

## 2023-11-22 ENCOUNTER — APPOINTMENT (OUTPATIENT)
Dept: NEUROLOGY | Facility: CLINIC | Age: 68
End: 2023-11-22
Payer: MEDICARE

## 2023-11-22 VITALS
HEIGHT: 62 IN | HEART RATE: 75 BPM | SYSTOLIC BLOOD PRESSURE: 127 MMHG | OXYGEN SATURATION: 98 % | DIASTOLIC BLOOD PRESSURE: 76 MMHG | BODY MASS INDEX: 32.57 KG/M2 | WEIGHT: 177 LBS | TEMPERATURE: 97.6 F

## 2023-11-22 DIAGNOSIS — I63.9 CEREBRAL INFARCTION, UNSPECIFIED: ICD-10-CM

## 2023-11-22 PROCEDURE — 99214 OFFICE O/P EST MOD 30 MIN: CPT

## 2023-11-27 ENCOUNTER — NON-APPOINTMENT (OUTPATIENT)
Age: 68
End: 2023-11-27

## 2023-11-27 LAB
APO LP(A) SERPL-MCNC: 138.3 NMOL/L
CHOLEST SERPL-MCNC: 137 MG/DL
HDLC SERPL-MCNC: 42 MG/DL
LDLC SERPL CALC-MCNC: 73 MG/DL
NONHDLC SERPL-MCNC: 95 MG/DL
TRIGL SERPL-MCNC: 125 MG/DL
TSH SERPL-ACNC: 0.84 UIU/ML

## 2024-01-04 ENCOUNTER — APPOINTMENT (OUTPATIENT)
Dept: NEPHROLOGY | Facility: CLINIC | Age: 69
End: 2024-01-04
Payer: MEDICARE

## 2024-01-04 VITALS
SYSTOLIC BLOOD PRESSURE: 115 MMHG | WEIGHT: 175 LBS | BODY MASS INDEX: 32.01 KG/M2 | DIASTOLIC BLOOD PRESSURE: 58 MMHG | RESPIRATION RATE: 14 BRPM | HEART RATE: 76 BPM

## 2024-01-04 DIAGNOSIS — I48.91 UNSPECIFIED ATRIAL FIBRILLATION: ICD-10-CM

## 2024-01-04 PROCEDURE — 99215 OFFICE O/P EST HI 40 MIN: CPT

## 2024-01-04 PROCEDURE — G2211 COMPLEX E/M VISIT ADD ON: CPT

## 2024-01-08 NOTE — ASSESSMENT
[FreeTextEntry1] : 67 yo F with pmhx of HTN, DM, Stroke 7/2021 who presents today for followup of CKD3B  BRITT on CKD vs CKD progression - sCr does improve with hydration but patient has trouble keeping up, may need to have further rhelp to increase. sCr cole 1 but has not been in some time - Avoiding NSAIDs - Limiting salt intake and increasing water intake and exercise  HTN - currently at goal Discussed how to take BP appropriately at home. Advised not to smoke, drink caffeinated beverages or exercise within 30 minutes before measuring BP. Make sure cuff fits arm, as small cuffs can artificially raise BP. Make sure bladder is empty. With the cuff on your bare arm, sit in an upright position with back supported, feet flat on the floor and arm supported at heart level. Make sure the bottom of the cuff is directly above the bend of the elbow. Relax for about five minutes before taking a measurement. Resist the urge to talk or look at a cellphone. Wait one minute and retake BP, consider 3rd if elevated. Average the three readings and record in log book, bring to each check up. Bring device at next visit to ensure accuracy  DM - currently at goal  MBD-CKD - check phos and vitamin d, pth today  Anemia-CKD - check ferritin iron sat today  Dehydration - increase water intake  Weight loss - increase exercise and caloric intake  RTC in 3-5 month, labs prior

## 2024-01-08 NOTE — HISTORY OF PRESENT ILLNESS
[FreeTextEntry1] : 67 yo F with pmhx of HTN, DM, Stroke 7/2021 who presents today for followup of CKD3B  Labs from friday reviewed with patient, stable, is trying to drink more water. Still trying to get to a point wher sae can live on her own. Increasing exercise with rehab.   No particular other issues, will continue to increase walking and diet

## 2024-01-08 NOTE — ASSESSMENT
Goal Outcome Evaluation:  Plan of Care Reviewed With: patient, spouse           Outcome Evaluation: Pt admitted from home with syncope after HD, orthostaics negative today per RN.      Pt reportsnormally not using any AD at home, RW outside of home, recently DC home from SNU, ind. w/ dressing/bathing but spuse does all grocery shopping.  trilevel home with bedroom and bathroom upstairs.  Today pt demonstrates generalized weakness, impaired balance and gait pattern from baseline but able toa mbulate 15' +15' w/ min A using RW, slow shuffling steps withseated rest break for toileting needs.  Nursing reports this is 7th bowel movement today.  Spouse expresses some concerns about pts self care/hygiene at home; spouse reports pt does have h/o depression and does or used to take medicine - discussed with RN.  May benefit from DC to SNU, discussed DC plan/issues w/ pt, spouse, CCP and RN. Recommend sitting in chair few hours/day and ambulating with staff.      Anticipated Discharge Disposition (PT): home with 24/7 care, home with home health (may benefit from SNU?)   [FreeTextEntry1] : 67 yo F with pmhx of HTN, DM, Stroke 7/2021 who presents today for followup of CKD3B  BRITT on CKD vs CKD progression - sCr does improve with hydration but patient has trouble keeping up, may need to have further rhelp to increase. sCr cole 1 but has not been in some time - Avoiding NSAIDs - Limiting salt intake and increasing water intake and exercise  HTN - currently at goal Discussed how to take BP appropriately at home. Advised not to smoke, drink caffeinated beverages or exercise within 30 minutes before measuring BP. Make sure cuff fits arm, as small cuffs can artificially raise BP. Make sure bladder is empty. With the cuff on your bare arm, sit in an upright position with back supported, feet flat on the floor and arm supported at heart level. Make sure the bottom of the cuff is directly above the bend of the elbow. Relax for about five minutes before taking a measurement. Resist the urge to talk or look at a cellphone. Wait one minute and retake BP, consider 3rd if elevated. Average the three readings and record in log book, bring to each check up. Bring device at next visit to ensure accuracy  DM - currently at goal  MBD-CKD - check phos and vitamin d, pth today  Anemia-CKD - check ferritin iron sat today  Dehydration - increase water intake  Weight loss - increase exercise and caloric intake  RTC in 3-5 month, labs prior

## 2024-01-08 NOTE — PHYSICAL EXAM
[General Appearance - Alert] : alert [General Appearance - In No Acute Distress] : in no acute distress [General Appearance - Well Nourished] : well nourished [General Appearance - Well Developed] : well developed [General Appearance - Well-Appearing] : healthy appearing [Sclera] : the sclera and conjunctiva were normal [PERRL With Normal Accommodation] : pupils were equal in size, round, and reactive to light [Extraocular Movements] : extraocular movements were intact [Outer Ear] : the ears and nose were normal in appearance [Oropharynx] : the oropharynx was normal [Neck Appearance] : the appearance of the neck was normal [Neck Cervical Mass (___cm)] : no neck mass was observed [Jugular Venous Distention Increased] : there was no jugular-venous distention [Auscultation Breath Sounds / Voice Sounds] : lungs were clear to auscultation bilaterally [Heart Rate And Rhythm] : heart rate was normal and rhythm regular [Heart Sounds] : normal S1 and S2 [Heart Sounds Gallop] : no gallops [Murmurs] : no murmurs [Heart Sounds Pericardial Friction Rub] : no pericardial rub [Edema] : there was no peripheral edema [Veins - Varicosity Changes] : there were no varicosital changes [Bowel Sounds] : normal bowel sounds [Abdomen Soft] : soft [Abdomen Tenderness] : non-tender [No CVA Tenderness] : no ~M costovertebral angle tenderness [No Spinal Tenderness] : no spinal tenderness [Abnormal Walk] : normal gait [Involuntary Movements] : no involuntary movements were seen [Skin Color & Pigmentation] : normal skin color and pigmentation [Skin Turgor] : normal skin turgor [] : no rash [Sensation] : the sensory exam was normal to light touch and pinprick [No Focal Deficits] : no focal deficits [FreeTextEntry1] : Has halting speech, some word finding difficulty but overall communicates well [Affect] : the affect was normal [Mood] : the mood was normal

## 2024-01-12 ENCOUNTER — APPOINTMENT (OUTPATIENT)
Dept: NEPHROLOGY | Facility: CLINIC | Age: 69
End: 2024-01-12
Payer: MEDICARE

## 2024-01-12 VITALS — HEART RATE: 76 BPM | SYSTOLIC BLOOD PRESSURE: 113 MMHG | DIASTOLIC BLOOD PRESSURE: 56 MMHG | RESPIRATION RATE: 12 BRPM

## 2024-01-12 PROCEDURE — 99214 OFFICE O/P EST MOD 30 MIN: CPT

## 2024-01-12 PROCEDURE — G2211 COMPLEX E/M VISIT ADD ON: CPT

## 2024-01-17 NOTE — PHYSICAL EXAM
[General Appearance - Alert] : alert [General Appearance - In No Acute Distress] : in no acute distress [General Appearance - Well Nourished] : well nourished [General Appearance - Well Developed] : well developed [General Appearance - Well-Appearing] : healthy appearing [Sclera] : the sclera and conjunctiva were normal [PERRL With Normal Accommodation] : pupils were equal in size, round, and reactive to light [Extraocular Movements] : extraocular movements were intact [Outer Ear] : the ears and nose were normal in appearance [Oropharynx] : the oropharynx was normal [Neck Appearance] : the appearance of the neck was normal [Neck Cervical Mass (___cm)] : no neck mass was observed [Jugular Venous Distention Increased] : there was no jugular-venous distention [Respiration, Rhythm And Depth] : normal respiratory rhythm and effort [Exaggerated Use Of Accessory Muscles For Inspiration] : no accessory muscle use [Auscultation Breath Sounds / Voice Sounds] : lungs were clear to auscultation bilaterally [Heart Rate And Rhythm] : heart rate was normal and rhythm regular [Heart Sounds] : normal S1 and S2 [Heart Sounds Gallop] : no gallops [Murmurs] : no murmurs [Heart Sounds Pericardial Friction Rub] : no pericardial rub [Edema] : there was no peripheral edema [Veins - Varicosity Changes] : there were no varicosital changes [Bowel Sounds] : normal bowel sounds [Abdomen Soft] : soft [Abdomen Tenderness] : non-tender [No CVA Tenderness] : no ~M costovertebral angle tenderness [No Spinal Tenderness] : no spinal tenderness [Abnormal Walk] : normal gait [Involuntary Movements] : no involuntary movements were seen [Skin Color & Pigmentation] : normal skin color and pigmentation [Skin Turgor] : normal skin turgor [] : no rash [Sensation] : the sensory exam was normal to light touch and pinprick [No Focal Deficits] : no focal deficits [FreeTextEntry1] : Has halting speech, some word finding difficulty but overall communicates well [Affect] : the affect was normal [Mood] : the mood was normal

## 2024-01-17 NOTE — HISTORY OF PRESENT ILLNESS
[FreeTextEntry1] : 69 yo F with pmhx of HTN, DM, Stroke 7/2021 who presents today for followup of CKD3B  Seen last week, all stable since then. BP excellent  No particular other issues, will continue to increase walking and diet

## 2024-02-02 ENCOUNTER — APPOINTMENT (OUTPATIENT)
Dept: NEPHROLOGY | Facility: CLINIC | Age: 69
End: 2024-02-02

## 2024-04-15 ENCOUNTER — APPOINTMENT (OUTPATIENT)
Dept: GASTROENTEROLOGY | Facility: CLINIC | Age: 69
End: 2024-04-15
Payer: MEDICARE

## 2024-04-15 DIAGNOSIS — Z71.3 DIETARY COUNSELING AND SURVEILLANCE: ICD-10-CM

## 2024-04-15 PROCEDURE — 97802 MEDICAL NUTRITION INDIV IN: CPT

## 2024-05-08 ENCOUNTER — APPOINTMENT (OUTPATIENT)
Dept: NEUROLOGY | Facility: CLINIC | Age: 69
End: 2024-05-08

## 2024-05-24 ENCOUNTER — APPOINTMENT (OUTPATIENT)
Dept: NEPHROLOGY | Facility: CLINIC | Age: 69
End: 2024-05-24
Payer: MEDICARE

## 2024-05-24 VITALS — DIASTOLIC BLOOD PRESSURE: 66 MMHG | SYSTOLIC BLOOD PRESSURE: 124 MMHG

## 2024-05-24 DIAGNOSIS — N25.81 SECONDARY HYPERPARATHYROIDISM OF RENAL ORIGIN: ICD-10-CM

## 2024-05-24 DIAGNOSIS — E11.9 TYPE 2 DIABETES MELLITUS W/OUT COMPLICATIONS: ICD-10-CM

## 2024-05-24 DIAGNOSIS — I10 ESSENTIAL (PRIMARY) HYPERTENSION: ICD-10-CM

## 2024-05-24 DIAGNOSIS — N18.2 CHRONIC KIDNEY DISEASE, STAGE 2 (MILD): ICD-10-CM

## 2024-05-24 PROCEDURE — G2211 COMPLEX E/M VISIT ADD ON: CPT

## 2024-05-24 PROCEDURE — 99215 OFFICE O/P EST HI 40 MIN: CPT

## 2024-05-27 VITALS — HEART RATE: 77 BPM | RESPIRATION RATE: 12 BRPM

## 2024-05-27 NOTE — PHYSICAL EXAM
[Sclera] : the sclera and conjunctiva were normal [Auscultation Breath Sounds / Voice Sounds] : lungs were clear to auscultation bilaterally [Heart Rate And Rhythm] : heart rate was normal and rhythm regular [Affect] : the affect was normal [General Appearance - Alert] : alert [General Appearance - In No Acute Distress] : in no acute distress [General Appearance - Well Nourished] : well nourished [General Appearance - Well Developed] : well developed [General Appearance - Well-Appearing] : healthy appearing [PERRL With Normal Accommodation] : pupils were equal in size, round, and reactive to light [Extraocular Movements] : extraocular movements were intact [Outer Ear] : the ears and nose were normal in appearance [Oropharynx] : the oropharynx was normal [Neck Appearance] : the appearance of the neck was normal [Neck Cervical Mass (___cm)] : no neck mass was observed [Jugular Venous Distention Increased] : there was no jugular-venous distention [Respiration, Rhythm And Depth] : normal respiratory rhythm and effort [Exaggerated Use Of Accessory Muscles For Inspiration] : no accessory muscle use [Heart Sounds] : normal S1 and S2 [Heart Sounds Gallop] : no gallops [Murmurs] : no murmurs [Heart Sounds Pericardial Friction Rub] : no pericardial rub [Edema] : there was no peripheral edema [Veins - Varicosity Changes] : there were no varicosital changes [Bowel Sounds] : normal bowel sounds [Abdomen Soft] : soft [Abdomen Tenderness] : non-tender [No CVA Tenderness] : no ~M costovertebral angle tenderness [No Spinal Tenderness] : no spinal tenderness [Abnormal Walk] : normal gait [Involuntary Movements] : no involuntary movements were seen [Skin Color & Pigmentation] : normal skin color and pigmentation [Skin Turgor] : normal skin turgor [] : no rash [Sensation] : the sensory exam was normal to light touch and pinprick [No Focal Deficits] : no focal deficits [FreeTextEntry1] : Has halting speech, some word finding difficulty but overall communicates well [Mood] : the mood was normal

## 2024-05-27 NOTE — HISTORY OF PRESENT ILLNESS
[FreeTextEntry1] : 67 yo F with pmhx of HTN, DM, Stroke 7/2021 who presents today for followup of CKD3B  stays at nursing home, filled out molst (full code) there although she forgot about it at nursing home they give her her meds doesn't like it there, if moved out would be on her own, has her one son who would help  nursing home makes all the food. drinks 3-4 cups of water per day. appetite has been ok  thinks has gained few pounds.

## 2024-05-27 NOTE — ASSESSMENT
[FreeTextEntry1] : 69 yo F with pmhx of HTN, DM, Stroke 7/2021 who presents today for followup of CKD3B  BRITT on CKD vs CKD progression - sCr does improve with hydration but patient has trouble keeping up, sCr cole 1  - Avoiding NSAIDs - Limiting salt intake and **increasing water intake (has 3-4 cups/day)  - will check labs today  HTN - currently at goal  DM - a1c 6.9 Nov '23, on jardiance  MBD-CKD - check labs today  Anemia-CKD - check labs today  Dehydration - encouraged increase water intake, has 3-4 cups per day  Weight loss - good appetite as per patient, monitor weight

## 2024-05-27 NOTE — REVIEW OF SYSTEMS
[Chest Pain] : no chest pain [Lower Ext Edema] : no lower extremity edema [Shortness Of Breath] : no shortness of breath [SOB on Exertion] : no shortness of breath during exertion [Negative] : Heme/Lymph

## 2024-06-03 LAB
25(OH)D3 SERPL-MCNC: 34.4 NG/ML
ALBUMIN SERPL ELPH-MCNC: 4.1 G/DL
ALP BLD-CCNC: 71 U/L
ALT SERPL-CCNC: 8 U/L
ANION GAP SERPL CALC-SCNC: 12 MMOL/L
APPEARANCE: ABNORMAL
AST SERPL-CCNC: 15 U/L
BACTERIA: ABNORMAL /HPF
BASOPHILS # BLD AUTO: 0.02 K/UL
BASOPHILS NFR BLD AUTO: 0.4 %
BILIRUB SERPL-MCNC: 0.3 MG/DL
BILIRUBIN URINE: ABNORMAL
BLOOD URINE: NEGATIVE
BUN SERPL-MCNC: 14 MG/DL
CALCIUM SERPL-MCNC: 9 MG/DL
CALCIUM SERPL-MCNC: 9 MG/DL
CAST: 34 /LPF
CHLORIDE SERPL-SCNC: 105 MMOL/L
CO2 SERPL-SCNC: 25 MMOL/L
COLOR: NORMAL
CREAT SERPL-MCNC: 1.21 MG/DL
CREAT SPEC-SCNC: 565 MG/DL
CREAT SPEC-SCNC: 565 MG/DL
CREAT/PROT UR: 0.1 RATIO
CYSTATIN C SERPL-MCNC: 1.65 MG/L
EGFR: 48 ML/MIN/1.73M2
EOSINOPHIL # BLD AUTO: 0.14 K/UL
EOSINOPHIL NFR BLD AUTO: 2.9 %
EPITHELIAL CELLS: 20 /HPF
ESTIMATED AVERAGE GLUCOSE: 143 MG/DL
FERRITIN SERPL-MCNC: 40 NG/ML
GFR/BSA.PRED SERPLBLD CYS-BASED-ARV: 36 ML/MIN/1.73M2
GLUCOSE QUALITATIVE U: NEGATIVE MG/DL
GLUCOSE SERPL-MCNC: 112 MG/DL
HBA1C MFR BLD HPLC: 6.6 %
HCT VFR BLD CALC: 37.1 %
HGB BLD-MCNC: 11 G/DL
HYALINE CASTS: PRESENT
IMM GRANULOCYTES NFR BLD AUTO: 0.2 %
IRON SATN MFR SERPL: 25 %
IRON SERPL-MCNC: 68 UG/DL
KETONES URINE: 15 MG/DL
LEUKOCYTE ESTERASE URINE: ABNORMAL
LYMPHOCYTES # BLD AUTO: 1.13 K/UL
LYMPHOCYTES NFR BLD AUTO: 23.4 %
MAN DIFF?: NORMAL
MCHC RBC-ENTMCNC: 26.7 PG
MCHC RBC-ENTMCNC: 29.6 GM/DL
MCV RBC AUTO: 90 FL
MICROALBUMIN 24H UR DL<=1MG/L-MCNC: 3.9 MG/DL
MICROALBUMIN/CREAT 24H UR-RTO: 7 MG/G
MICROSCOPIC-UA: NORMAL
MONOCYTES # BLD AUTO: 0.51 K/UL
MONOCYTES NFR BLD AUTO: 10.6 %
NEUTROPHILS # BLD AUTO: 3.02 K/UL
NEUTROPHILS NFR BLD AUTO: 62.5 %
NITRITE URINE: NEGATIVE
PARATHYROID HORMONE INTACT: 89 PG/ML
PH URINE: 5.5
PHOSPHATE SERPL-MCNC: 3 MG/DL
PLATELET # BLD AUTO: 199 K/UL
POTASSIUM SERPL-SCNC: 3.3 MMOL/L
PROT SERPL-MCNC: 7 G/DL
PROT UR-MCNC: 51 MG/DL
PROTEIN URINE: 30 MG/DL
RBC # BLD: 4.12 M/UL
RBC # FLD: 14.7 %
RED BLOOD CELLS URINE: NORMAL /HPF
REVIEW: NORMAL
SODIUM ?TM SUB UR QN: <20 MMOL/L
SODIUM SERPL-SCNC: 142 MMOL/L
SPECIFIC GRAVITY URINE: >1.03
TIBC SERPL-MCNC: 269 UG/DL
TSH SERPL-ACNC: 0.72 UIU/ML
UIBC SERPL-MCNC: 201 UG/DL
UROBILINOGEN URINE: 1 MG/DL
WBC # FLD AUTO: 4.83 K/UL
WHITE BLOOD CELLS URINE: 2 /HPF

## 2024-07-05 ENCOUNTER — APPOINTMENT (OUTPATIENT)
Dept: NEPHROLOGY | Facility: CLINIC | Age: 69
End: 2024-07-05

## 2024-09-30 ENCOUNTER — NON-APPOINTMENT (OUTPATIENT)
Age: 69
End: 2024-09-30

## 2024-10-01 ENCOUNTER — APPOINTMENT (OUTPATIENT)
Dept: GASTROENTEROLOGY | Facility: CLINIC | Age: 69
End: 2024-10-01
Payer: MEDICARE

## 2024-10-01 VITALS
TEMPERATURE: 95.1 F | DIASTOLIC BLOOD PRESSURE: 70 MMHG | HEIGHT: 62 IN | SYSTOLIC BLOOD PRESSURE: 112 MMHG | RESPIRATION RATE: 14 BRPM | HEART RATE: 67 BPM | BODY MASS INDEX: 32.57 KG/M2 | OXYGEN SATURATION: 98 % | WEIGHT: 177 LBS

## 2024-10-01 DIAGNOSIS — D12.8 BENIGN NEOPLASM OF RECTUM: ICD-10-CM

## 2024-10-01 DIAGNOSIS — D64.9 ANEMIA, UNSPECIFIED: ICD-10-CM

## 2024-10-01 PROCEDURE — 99215 OFFICE O/P EST HI 40 MIN: CPT

## 2024-10-01 RX ORDER — POLYETHYLENE GLYCOL 3350 AND ELECTROLYTES WITH LEMON FLAVOR 236; 22.74; 6.74; 5.86; 2.97 G/4L; G/4L; G/4L; G/4L; G/4L
236 POWDER, FOR SOLUTION ORAL
Qty: 1 | Refills: 0 | Status: ACTIVE | COMMUNITY
Start: 2024-10-01 | End: 1900-01-01

## 2024-10-01 NOTE — PHYSICAL EXAM
[Alert] : alert [No Respiratory Distress] : no respiratory distress [None] : no edema [Abdomen Soft] : soft

## 2024-10-02 LAB
ANION GAP SERPL CALC-SCNC: 13 MMOL/L
BUN SERPL-MCNC: 14 MG/DL
CALCIUM SERPL-MCNC: 9.2 MG/DL
CHLORIDE SERPL-SCNC: 106 MMOL/L
CO2 SERPL-SCNC: 25 MMOL/L
CREAT SERPL-MCNC: 1.14 MG/DL
EGFR: 51 ML/MIN/1.73M2
FERRITIN SERPL-MCNC: 36 NG/ML
GLUCOSE SERPL-MCNC: 134 MG/DL
HCT VFR BLD CALC: 36.3 %
HGB BLD-MCNC: 11.5 G/DL
IRON SATN MFR SERPL: 20 %
IRON SERPL-MCNC: 50 UG/DL
MCHC RBC-ENTMCNC: 27.4 PG
MCHC RBC-ENTMCNC: 31.7 GM/DL
MCV RBC AUTO: 86.6 FL
PLATELET # BLD AUTO: 234 K/UL
POTASSIUM SERPL-SCNC: 4 MMOL/L
RBC # BLD: 4.19 M/UL
RBC # FLD: 14.9 %
SODIUM SERPL-SCNC: 143 MMOL/L
TIBC SERPL-MCNC: 245 UG/DL
UIBC SERPL-MCNC: 195 UG/DL
WBC # FLD AUTO: 4.26 K/UL

## 2024-10-08 NOTE — ASSESSMENT
[FreeTextEntry1] : 1. Iron deficiency anemia, BRBPR Pt with colonoscopy 1 yr ago however has new BRBPR and LUANNE thus repeat colo warranted especially given hx of recurrent rectal TVA. Has not had EGD before either. - EGD/colonoscopy at [facility] with Golytely prep - Ideally would hold apixaban 48 hrs before procedure; given hx of CVA would appreciate cardiology input as to if pt needs bridging - Pt states she has cardiologist; would check with pt's facility and if none on file can see cardiology at [facility] - Risks/benefits and need for adult chaperone day-of procedure discussed

## 2024-10-08 NOTE — END OF VISIT
[FreeTextEntry3] : 69F PMHx CAD, afib (on apixaban), L frontal CVA (2021), CKD 3B, rectal TVA w/ HGD presenting for follow up of recurring, previously resected TVA with HGD polyp in rectum. Last c-scope in 2023. Now reporting some rectal bleeding, no other complaints. Hgb stable per record review. Fe studies with ferritin noted to be 40, c/w LUANNE. WIll repeat BW today. If persistently low, can consider adding on EGD (no prior) in addition to colonoscopy. R/B/A/L discussed with patient. All questions answered to patient's satisfaction.

## 2024-10-08 NOTE — HISTORY OF PRESENT ILLNESS
[de-identified] : Never per pt [FreeTextEntry1] : 9/27/23: 3-4mm descending TA x2, 1cm rectal TVA with foci of HGD. 1/19/22: 5-10mm descending/sigmoid TA x5, rectal TVA with foci of HGD. 11/11/20: Sigmoidoscopy with prior rectal EMR scar. 2/13/20: Sigmoidoscopy with well-healed EMR site. 10/19/19: Sigmoidoscopy with well-healed EMR site. 1/24/19: Sigmoidoscopy with well-healed EMR site. 12/5/18: Sigmoidoscopy with EMR site and single area (s/p snare, path TVA) 9/27/18: 6cm rectal TVA with HGD (s/p EMR).

## 2024-10-08 NOTE — ADDENDUM
[FreeTextEntry1] : Reviewed case with pt's neurologist who recommended bridging apixaban if possible. This question was the reason for which we sought cardiology input as she did not have any new cardiac sx that suggested there would be any change in cardiac status since her last colonoscopy. Discussed with pt's nursing home physician Dr. Dowell ([phone]) who also did not note any cardiac sx from her end and agreed with plan to bridge. Will plan to proceed with EGD/colo at this point.

## 2024-10-08 NOTE — ADDENDUM
[FreeTextEntry1] : Reviewed case with pt's neurologist who recommended bridging apixaban if possible. This question was the reason for which we sought cardiology input as she did not have any new cardiac sx that suggested there would be any change in cardiac status since her last colonoscopy. Discussed with pt's nursing home physician Dr. Dowell ([phone]) who also did not note any cardiac sx from her end and agreed with plan to bridge. Will plan to proceed with EGD/colo at this point.

## 2024-10-08 NOTE — ASSESSMENT
[FreeTextEntry1] : 1. Iron deficiency anemia, BRBPR Pt with colonoscopy 1 yr ago however has new BRBPR and LUANNE thus repeat colo warranted especially given hx of recurrent rectal TVA. Has not had EGD before either. - EGD/colonoscopy at [facility] with Golytely prep - Ideally would hold apixaban 48 hrs before procedure; given hx of CVA would appreciate cardiology input as to if pt needs bridging - Pt states she has cardiologist; would check with pt's facility and if none on file can see cardiology at [facility] - Risks/benefits and need for adult chaperone day-of procedure discussed

## 2024-10-08 NOTE — ASSESSMENT
[FreeTextEntry1] : 1. Iron deficiency anemia, BRBPR Pt with colonoscopy 1 yr ago however has new BRBPR and LUANNE thus repeat colo warranted especially given hx of recurrent rectal TVA. Has not had EGD before either. - EGD/colonoscopy at [facility] with Golytely prep - Ideally would hold apixaban 48 hrs before procedure; given hx of CVA would appreciate cardiology input as to if pt needs bridging - Pt states she has cardiologist; would check with pt's facility and if none on file can see cardiology at [facility] - Risks/benefits and need for adult chaperone day-of procedure discussed

## 2024-10-08 NOTE — HISTORY OF PRESENT ILLNESS
[de-identified] : Never per pt [FreeTextEntry1] : 9/27/23: 3-4mm descending TA x2, 1cm rectal TVA with foci of HGD. 1/19/22: 5-10mm descending/sigmoid TA x5, rectal TVA with foci of HGD. 11/11/20: Sigmoidoscopy with prior rectal EMR scar. 2/13/20: Sigmoidoscopy with well-healed EMR site. 10/19/19: Sigmoidoscopy with well-healed EMR site. 1/24/19: Sigmoidoscopy with well-healed EMR site. 12/5/18: Sigmoidoscopy with EMR site and single area (s/p snare, path TVA) 9/27/18: 6cm rectal TVA with HGD (s/p EMR).

## 2024-10-08 NOTE — ADDENDUM
[FreeTextEntry1] : Reviewed case with pt's neurologist who recommended bridging apixaban if possible. This question was the reason for which we sought cardiology input as she did not have any new cardiac sx that suggested there would be any change in cardiac status since her last colonoscopy. Discussed with pt's nursing home physician Dr. Dowell ([phone]) who also did not note any cardiac sx from her end and agreed with plan to bridge. Will plan to proceed with EGD/colo at this point.

## 2024-10-08 NOTE — HISTORY OF PRESENT ILLNESS
[de-identified] : Never per pt [FreeTextEntry1] : 9/27/23: 3-4mm descending TA x2, 1cm rectal TVA with foci of HGD. 1/19/22: 5-10mm descending/sigmoid TA x5, rectal TVA with foci of HGD. 11/11/20: Sigmoidoscopy with prior rectal EMR scar. 2/13/20: Sigmoidoscopy with well-healed EMR site. 10/19/19: Sigmoidoscopy with well-healed EMR site. 1/24/19: Sigmoidoscopy with well-healed EMR site. 12/5/18: Sigmoidoscopy with EMR site and single area (s/p snare, path TVA) 9/27/18: 6cm rectal TVA with HGD (s/p EMR).

## 2024-10-17 ENCOUNTER — APPOINTMENT (OUTPATIENT)
Dept: NEPHROLOGY | Facility: CLINIC | Age: 69
End: 2024-10-17
Payer: MEDICARE

## 2024-10-17 VITALS — SYSTOLIC BLOOD PRESSURE: 162 MMHG | RESPIRATION RATE: 12 BRPM | DIASTOLIC BLOOD PRESSURE: 70 MMHG | HEART RATE: 66 BPM

## 2024-10-17 DIAGNOSIS — I63.9 CEREBRAL INFARCTION, UNSPECIFIED: ICD-10-CM

## 2024-10-17 DIAGNOSIS — E11.9 TYPE 2 DIABETES MELLITUS W/OUT COMPLICATIONS: ICD-10-CM

## 2024-10-17 DIAGNOSIS — N18.2 CHRONIC KIDNEY DISEASE, STAGE 2 (MILD): ICD-10-CM

## 2024-10-17 DIAGNOSIS — N25.81 SECONDARY HYPERPARATHYROIDISM OF RENAL ORIGIN: ICD-10-CM

## 2024-10-17 DIAGNOSIS — I10 ESSENTIAL (PRIMARY) HYPERTENSION: ICD-10-CM

## 2024-10-17 PROCEDURE — 99215 OFFICE O/P EST HI 40 MIN: CPT

## 2024-10-17 PROCEDURE — G2211 COMPLEX E/M VISIT ADD ON: CPT

## 2024-11-20 ENCOUNTER — NON-APPOINTMENT (OUTPATIENT)
Age: 69
End: 2024-11-20

## 2024-11-20 ENCOUNTER — APPOINTMENT (OUTPATIENT)
Dept: NEUROLOGY | Facility: CLINIC | Age: 69
End: 2024-11-20
Payer: MEDICARE

## 2024-11-20 VITALS
HEART RATE: 62 BPM | DIASTOLIC BLOOD PRESSURE: 90 MMHG | WEIGHT: 184.4 LBS | OXYGEN SATURATION: 98 % | SYSTOLIC BLOOD PRESSURE: 144 MMHG | TEMPERATURE: 98 F | BODY MASS INDEX: 33.73 KG/M2

## 2024-11-20 DIAGNOSIS — I63.9 CEREBRAL INFARCTION, UNSPECIFIED: ICD-10-CM

## 2024-11-20 PROCEDURE — 99214 OFFICE O/P EST MOD 30 MIN: CPT

## 2024-11-20 PROCEDURE — G2211 COMPLEX E/M VISIT ADD ON: CPT

## 2024-11-27 ENCOUNTER — APPOINTMENT (OUTPATIENT)
Dept: GASTROENTEROLOGY | Facility: HOSPITAL | Age: 69
End: 2024-11-27

## 2024-12-10 ENCOUNTER — APPOINTMENT (OUTPATIENT)
Dept: NEUROLOGY | Facility: CLINIC | Age: 69
End: 2024-12-10

## 2024-12-10 PROCEDURE — 93880 EXTRACRANIAL BILAT STUDY: CPT

## 2024-12-18 ENCOUNTER — OUTPATIENT (OUTPATIENT)
Dept: OUTPATIENT SERVICES | Facility: HOSPITAL | Age: 69
LOS: 1 days | Discharge: HOME | End: 2024-12-18
Payer: COMMERCIAL

## 2024-12-18 ENCOUNTER — RESULT REVIEW (OUTPATIENT)
Age: 69
End: 2024-12-18

## 2024-12-18 ENCOUNTER — APPOINTMENT (OUTPATIENT)
Dept: GASTROENTEROLOGY | Facility: HOSPITAL | Age: 69
End: 2024-12-18

## 2024-12-18 PROCEDURE — 43239 EGD BIOPSY SINGLE/MULTIPLE: CPT

## 2024-12-18 PROCEDURE — C1889: CPT

## 2024-12-18 PROCEDURE — 88342 IMHCHEM/IMCYTCHM 1ST ANTB: CPT

## 2024-12-18 PROCEDURE — 45380 COLONOSCOPY AND BIOPSY: CPT | Mod: XS

## 2024-12-18 PROCEDURE — 45385 COLONOSCOPY W/LESION REMOVAL: CPT

## 2024-12-18 PROCEDURE — 45381 COLONOSCOPY SUBMUCOUS NJX: CPT

## 2024-12-18 PROCEDURE — C9399: CPT

## 2024-12-18 PROCEDURE — 82962 GLUCOSE BLOOD TEST: CPT

## 2024-12-18 PROCEDURE — 88305 TISSUE EXAM BY PATHOLOGIST: CPT | Mod: 26

## 2024-12-18 PROCEDURE — 45390 COLONOSCOPY W/RESECTION: CPT

## 2024-12-18 PROCEDURE — 88305 TISSUE EXAM BY PATHOLOGIST: CPT

## 2024-12-18 PROCEDURE — 88342 IMHCHEM/IMCYTCHM 1ST ANTB: CPT | Mod: 26

## 2024-12-18 DEVICE — GEL PURASTAT 3ML: Type: IMPLANTABLE DEVICE | Status: FUNCTIONAL

## 2024-12-18 NOTE — PRE-ANESTHESIA EVALUATION ADULT - NSANTHPMHFT_GEN_ALL_CORE
> 4 METs no cardiac or resp disease  Htn  eliquis stop 3 days    no nausea, vomiting > 4 METs no angina or SOB on exertion  no resp disease  Htn tooks meds yesterday Dec 17  denies ischemic heart disease   Afib eliquis stop 3 days LD Dec 14  CKD  remote CVA "nose tingling" (or patient not aware of it) >no residual symptoms     no nausea, vomiting.. denies GERD

## 2024-12-18 NOTE — PRE-ANESTHESIA EVALUATION ADULT - NSANTHAIRWAYFT_ENT_ALL_CORE
MO3F TMD 3F Neck FROM upper and lower dentures     few random teeth top and bottom- not loose    MO3F thick neck TMD 3F Neck FROM

## 2024-12-26 ENCOUNTER — APPOINTMENT (OUTPATIENT)
Dept: GASTROENTEROLOGY | Facility: CLINIC | Age: 69
End: 2024-12-26
Payer: MEDICARE

## 2024-12-26 PROCEDURE — 99443: CPT

## 2025-04-17 ENCOUNTER — APPOINTMENT (OUTPATIENT)
Dept: NEPHROLOGY | Facility: CLINIC | Age: 70
End: 2025-04-17
Payer: MEDICARE

## 2025-04-17 DIAGNOSIS — N25.81 SECONDARY HYPERPARATHYROIDISM OF RENAL ORIGIN: ICD-10-CM

## 2025-04-17 DIAGNOSIS — D64.9 ANEMIA, UNSPECIFIED: ICD-10-CM

## 2025-04-17 DIAGNOSIS — I10 ESSENTIAL (PRIMARY) HYPERTENSION: ICD-10-CM

## 2025-04-17 DIAGNOSIS — N18.2 CHRONIC KIDNEY DISEASE, STAGE 2 (MILD): ICD-10-CM

## 2025-04-17 PROCEDURE — G2211 COMPLEX E/M VISIT ADD ON: CPT

## 2025-04-17 PROCEDURE — 99215 OFFICE O/P EST HI 40 MIN: CPT

## 2025-04-21 VITALS — RESPIRATION RATE: 12 BRPM | HEART RATE: 60 BPM | SYSTOLIC BLOOD PRESSURE: 128 MMHG | DIASTOLIC BLOOD PRESSURE: 88 MMHG

## 2025-04-21 LAB
25(OH)D3 SERPL-MCNC: 40.6 NG/ML
ALBUMIN SERPL ELPH-MCNC: 4 G/DL
ALP BLD-CCNC: 86 U/L
ALT SERPL-CCNC: 7 U/L
ANION GAP SERPL CALC-SCNC: 12 MMOL/L
AST SERPL-CCNC: 15 U/L
BASOPHILS # BLD AUTO: 0.02 K/UL
BASOPHILS NFR BLD AUTO: 0.4 %
BILIRUB SERPL-MCNC: 0.4 MG/DL
BUN SERPL-MCNC: 11 MG/DL
CALCIUM SERPL-MCNC: 9.1 MG/DL
CALCIUM SERPL-MCNC: 9.1 MG/DL
CHLORIDE SERPL-SCNC: 105 MMOL/L
CO2 SERPL-SCNC: 24 MMOL/L
CREAT SERPL-MCNC: 1.2 MG/DL
CYSTATIN C SERPL-MCNC: 1.72 MG/L
EGFRCR SERPLBLD CKD-EPI 2021: 48 ML/MIN/1.73M2
EOSINOPHIL # BLD AUTO: 0.07 K/UL
EOSINOPHIL NFR BLD AUTO: 1.4 %
FERRITIN SERPL-MCNC: 26 NG/ML
GFR/BSA.PRED SERPLBLD CYS-BASED-ARV: 34 ML/MIN/1.73M2
GLUCOSE SERPL-MCNC: 113 MG/DL
HCT VFR BLD CALC: 34.7 %
HGB BLD-MCNC: 10.4 G/DL
IMM GRANULOCYTES NFR BLD AUTO: 0.2 %
IRON SATN MFR SERPL: 16 %
IRON SERPL-MCNC: 46 UG/DL
LYMPHOCYTES # BLD AUTO: 1.33 K/UL
LYMPHOCYTES NFR BLD AUTO: 27.5 %
MAN DIFF?: NORMAL
MCHC RBC-ENTMCNC: 26.1 PG
MCHC RBC-ENTMCNC: 30 G/DL
MCV RBC AUTO: 87 FL
MONOCYTES # BLD AUTO: 0.36 K/UL
MONOCYTES NFR BLD AUTO: 7.4 %
NEUTROPHILS # BLD AUTO: 3.05 K/UL
NEUTROPHILS NFR BLD AUTO: 63.1 %
PARATHYROID HORMONE INTACT: 148 PG/ML
PHOSPHATE SERPL-MCNC: 3.8 MG/DL
PLATELET # BLD AUTO: 214 K/UL
POTASSIUM SERPL-SCNC: 3.5 MMOL/L
PROT SERPL-MCNC: 7.4 G/DL
RBC # BLD: 3.99 M/UL
RBC # FLD: 14.4 %
SODIUM SERPL-SCNC: 141 MMOL/L
TIBC SERPL-MCNC: 287 UG/DL
UIBC SERPL-MCNC: 241 UG/DL
WBC # FLD AUTO: 4.84 K/UL

## 2025-09-18 ENCOUNTER — APPOINTMENT (OUTPATIENT)
Dept: NEPHROLOGY | Facility: CLINIC | Age: 70
End: 2025-09-18

## 2025-09-18 DIAGNOSIS — I10 ESSENTIAL (PRIMARY) HYPERTENSION: ICD-10-CM

## 2025-09-18 DIAGNOSIS — N18.2 CHRONIC KIDNEY DISEASE, STAGE 2 (MILD): ICD-10-CM

## 2025-09-18 DIAGNOSIS — E11.9 TYPE 2 DIABETES MELLITUS W/OUT COMPLICATIONS: ICD-10-CM

## 2025-09-18 DIAGNOSIS — N25.81 SECONDARY HYPERPARATHYROIDISM OF RENAL ORIGIN: ICD-10-CM

## 2025-09-18 DIAGNOSIS — D64.9 ANEMIA, UNSPECIFIED: ICD-10-CM

## 2025-09-24 LAB
ALBUMIN SERPL ELPH-MCNC: 3.8 G/DL
ALP BLD-CCNC: 89 U/L
ALT SERPL-CCNC: 13 U/L
ANION GAP SERPL CALC-SCNC: 13 MMOL/L
AST SERPL-CCNC: 17 U/L
BASOPHILS # BLD AUTO: 0.02 K/UL
BASOPHILS NFR BLD AUTO: 0.5 %
BILIRUB SERPL-MCNC: 0.3 MG/DL
BUN SERPL-MCNC: 11 MG/DL
CALCIUM SERPL-MCNC: 8.9 MG/DL
CALCIUM SERPL-MCNC: 8.9 MG/DL
CHLORIDE SERPL-SCNC: 104 MMOL/L
CO2 SERPL-SCNC: 25 MMOL/L
CREAT SERPL-MCNC: 1.34 MG/DL
CYSTATIN C SERPL-MCNC: 1.7 MG/L
EGFRCR SERPLBLD CKD-EPI 2021: 42 ML/MIN/1.73M2
EOSINOPHIL # BLD AUTO: 0.08 K/UL
EOSINOPHIL NFR BLD AUTO: 1.8 %
FERRITIN SERPL-MCNC: 59 NG/ML
GFR/BSA.PRED SERPLBLD CYS-BASED-ARV: 34 ML/MIN/1.73M2
GLUCOSE SERPL-MCNC: 105 MG/DL
HCT VFR BLD CALC: 34.9 %
HGB BLD-MCNC: 11 G/DL
IMM GRANULOCYTES NFR BLD AUTO: 0.2 %
IRON SATN MFR SERPL: 27 %
IRON SERPL-MCNC: 68 UG/DL
LYMPHOCYTES # BLD AUTO: 1.14 K/UL
LYMPHOCYTES NFR BLD AUTO: 26.1 %
MAN DIFF?: NORMAL
MCHC RBC-ENTMCNC: 27.3 PG
MCHC RBC-ENTMCNC: 31.5 G/DL
MCV RBC AUTO: 86.6 FL
MONOCYTES # BLD AUTO: 0.42 K/UL
MONOCYTES NFR BLD AUTO: 9.6 %
NEUTROPHILS # BLD AUTO: 2.7 K/UL
NEUTROPHILS NFR BLD AUTO: 61.8 %
PARATHYROID HORMONE INTACT: 199 PG/ML
PHOSPHATE SERPL-MCNC: 3.8 MG/DL
PLATELET # BLD AUTO: 206 K/UL
POTASSIUM SERPL-SCNC: 3.7 MMOL/L
PROT SERPL-MCNC: 7.2 G/DL
RBC # BLD: 4.03 M/UL
RBC # FLD: 15.2 %
SODIUM SERPL-SCNC: 142 MMOL/L
TIBC SERPL-MCNC: 256 UG/DL
UIBC SERPL-MCNC: 188 UG/DL
WBC # FLD AUTO: 4.37 K/UL

## 2025-09-26 VITALS — RESPIRATION RATE: 12 BRPM | HEART RATE: 62 BPM | DIASTOLIC BLOOD PRESSURE: 80 MMHG | SYSTOLIC BLOOD PRESSURE: 126 MMHG

## (undated) DEVICE — FORCEP RADIAL JAW 4 W NDL 2.2MM 2.8MM 240CM ORANGE DISP

## (undated) DEVICE — PROBE FIAPC DIA 2.3MM/7FR LNTH 220CM/7.2FT

## (undated) DEVICE — ELCTR GROUNDING PAD ADULT COVIDIEN

## (undated) DEVICE — CATH GL0-TIP SPR

## (undated) DEVICE — Device

## (undated) DEVICE — FORCEP RADIAL JAW 4 240CM DISP

## (undated) DEVICE — SNARE CAPTIVATOR II 20MM

## (undated) DEVICE — LIFTER SYR EVERLIFT AGENT SUBMUCOSAL 10ML BLU

## (undated) DEVICE — ATTACH DISTAL

## (undated) DEVICE — SNARE LESIONHUNTER ROTAT NITNL COLD 15MM

## (undated) DEVICE — POLY TRAP ETRAP

## (undated) DEVICE — ENDOCUFF VISION SZ 2 LG GRN